# Patient Record
Sex: MALE | Race: WHITE | NOT HISPANIC OR LATINO | Employment: FULL TIME | URBAN - NONMETROPOLITAN AREA
[De-identification: names, ages, dates, MRNs, and addresses within clinical notes are randomized per-mention and may not be internally consistent; named-entity substitution may affect disease eponyms.]

---

## 2017-06-15 ENCOUNTER — APPOINTMENT (OUTPATIENT)
Dept: FAMILY MEDICINE CLINIC | Facility: CLINIC | Age: 27
End: 2017-06-15

## 2017-06-15 ENCOUNTER — GENERIC CONVERSION - ENCOUNTER (OUTPATIENT)
Dept: OTHER | Facility: OTHER | Age: 27
End: 2017-06-15

## 2017-06-15 PROCEDURE — T1015 CLINIC SERVICE: HCPCS | Performed by: FAMILY MEDICINE

## 2018-01-22 VITALS
BODY MASS INDEX: 31.98 KG/M2 | HEART RATE: 77 BPM | SYSTOLIC BLOOD PRESSURE: 130 MMHG | DIASTOLIC BLOOD PRESSURE: 82 MMHG | HEIGHT: 68 IN | WEIGHT: 211 LBS | RESPIRATION RATE: 16 BRPM | OXYGEN SATURATION: 98 % | TEMPERATURE: 96.7 F

## 2018-08-12 ENCOUNTER — HOSPITAL ENCOUNTER (EMERGENCY)
Facility: HOSPITAL | Age: 28
Discharge: HOME | End: 2018-08-12
Attending: EMERGENCY MEDICINE
Payer: COMMERCIAL

## 2018-08-12 VITALS
RESPIRATION RATE: 18 BRPM | WEIGHT: 230.6 LBS | TEMPERATURE: 99.1 F | DIASTOLIC BLOOD PRESSURE: 64 MMHG | HEIGHT: 68 IN | BODY MASS INDEX: 34.95 KG/M2 | SYSTOLIC BLOOD PRESSURE: 146 MMHG | OXYGEN SATURATION: 98 % | HEART RATE: 97 BPM

## 2018-08-12 DIAGNOSIS — R21 RASH: Primary | ICD-10-CM

## 2018-08-12 DIAGNOSIS — L23.7 POISON IVY: ICD-10-CM

## 2018-08-12 PROCEDURE — 99282 EMERGENCY DEPT VISIT SF MDM: CPT

## 2018-08-12 RX ORDER — SULFAMETHOXAZOLE AND TRIMETHOPRIM 800; 160 MG/1; MG/1
1 TABLET ORAL EVERY 12 HOURS SCHEDULED
Qty: 20 TABLET | Refills: 0 | Status: SHIPPED | OUTPATIENT
Start: 2018-08-12 | End: 2018-08-22

## 2018-08-12 RX ORDER — SULFAMETHOXAZOLE AND TRIMETHOPRIM 800; 160 MG/1; MG/1
1 TABLET ORAL ONCE
Status: COMPLETED | OUTPATIENT
Start: 2018-08-12 | End: 2018-08-12

## 2018-08-12 RX ORDER — CEPHALEXIN 250 MG/1
500 CAPSULE ORAL ONCE
Status: COMPLETED | OUTPATIENT
Start: 2018-08-12 | End: 2018-08-12

## 2018-08-12 RX ORDER — CEPHALEXIN 250 MG/1
500 CAPSULE ORAL 4 TIMES DAILY
Qty: 80 CAPSULE | Refills: 0 | Status: SHIPPED | OUTPATIENT
Start: 2018-08-12 | End: 2018-08-22

## 2018-08-12 RX ADMIN — SULFAMETHOXAZOLE AND TRIMETHOPRIM 1 TABLET: 800; 160 TABLET ORAL at 14:12

## 2018-08-12 RX ADMIN — CEPHALEXIN 500 MG: 250 CAPSULE ORAL at 14:13

## 2018-08-12 NOTE — DISCHARGE INSTRUCTIONS
Acute Rash   WHAT YOU NEED TO KNOW:   A rash is irritation, redness, or itchiness in the skin or mucus membranes  Mucus membranes are areas such as the lining of your nose or throat  Acute means the rash starts suddenly, worsens quickly, and lasts a short time  An acute rash may be caused by a disease, such as hepatitis or vasculitis  The rash may be a reaction to something you are allergic to, such as certain foods, or latex  Certain medicines, including antibiotics, NSAIDs, prescription pain medicine, and aspirin can also cause a rash  DISCHARGE INSTRUCTIONS:   Return to the emergency department if:   · You have sudden trouble breathing or chest pain  · You are vomiting, have a headache or muscle aches, and your throat hurts  Contact your healthcare provider if:   · You have a fever  · You get open wounds from scratching your skin, or you have a wound that is red, swollen, or painful  · Your rash lasts longer than 3 months  · You have swelling or pain in your joints  · You have questions or concerns about your condition or care  Medicines:  If your rash does not go away on its own, you may need the following medicines:  · Antihistamines  may be given to help decrease itching  · Steroids  may be given to decrease inflammation  · Antibiotics  help fight or prevent a bacterial infection  · Take your medicine as directed  Contact your healthcare provider if you think your medicine is not helping or if you have side effects  Tell him of her if you are allergic to any medicine  Keep a list of the medicines, vitamins, and herbs you take  Include the amounts, and when and why you take them  Bring the list or the pill bottles to follow-up visits  Carry your medicine list with you in case of an emergency  Prevent a rash or care for your skin when you have a rash:  Dry skin can lead to more problems  Do not scratch your skin if it itches  You may cause a skin infection by scratching   The following may prevent dry skin, and help your skin look better:  · Use thick cream lotions or petroleum jelly to help soothe your rash  These products work well on areas with thick skin, such as your feet  Cool compresses may also be used to soothe your skin  Apply a cool compress or a cool, wet towel, and then cover it with a dry towel  · Use lukewarm water when you bathe  Hot water may damage your skin more  Pat your skin dry  Do not rub your skin with a towel  · Use detergents, soaps, shampoos, and bubble baths made for sensitive skin  Wear clothes that are made of cotton instead of nylon or wool  Cotton is softer, so it will not hurt your skin as much  Follow up with your healthcare provider as directed: You may need to see a dermatologist if healthcare providers do not know what is causing your rash  You may also need to see a dermatologist if your rash does not get better even with treatment  You may need to see a dietitian if you have allergies to foods  Write down your questions so you remember to ask them during your visits  © 2017 [facility] Information is for End User's use only and may not be sold, redistributed or otherwise used for commercial purposes  All illustrations and images included in CareNotes® are the copyrighted property of [employer]  or [employer]  The above information is an  only  It is not intended as medical advice for individual conditions or treatments  Talk to your doctor, nurse or pharmacist before following any medical regimen to see if it is safe and effective for you  Contact Dermatitis   WHAT YOU NEED TO KNOW:   Contact dermatitis is a skin rash  It develops when you touch something that irritates your skin or causes an allergic reaction  DISCHARGE INSTRUCTIONS:   Call 911 for any of the following:   · You have sudden trouble breathing  · Your throat swells and you have trouble eating      · Your face is swollen  Contact your healthcare provider if:   · You have a fever  · Your blisters are draining pus  · Your rash spreads or does not get better, even after treatment  · You have questions or concerns about your condition or care  Medicines:   · Medicines  help decrease itching and swelling  They will be given as a topical medicine to apply to your rash or as a pill  · Take your medicine as directed  Contact your healthcare provider if you think your medicine is not helping or if you have side effects  Tell him or her if you are allergic to any medicine  Keep a list of the medicines, vitamins, and herbs you take  Include the amounts, and when and why you take them  Bring the list or the pill bottles to follow-up visits  Carry your medicine list with you in case of an emergency  Manage contact dermatitis:   · Take short baths or showers in cool water  Use mild soap or soap-free cleansers  Add oatmeal, baking soda, or cornstarch to the bath water to help decrease skin irritation  · Avoid skin irritants , such as makeup, hair products, soaps, and cleansers  Use products that do not contain perfume or dye  · Apply a cool compress to your rash  This will help soothe your skin  · Keep your skin moist   Rub unscented cream or lotion on your skin to prevent dryness and itching  Do this right after a bath or shower when your skin is still damp  Follow up with your healthcare provider or dermatologist in 2 to 3 days:  Write down your questions so you remember to ask them during your visits  © 2017 [employer] [employer] Information is for End User's use only and may not be sold, redistributed or otherwise used for commercial purposes  All illustrations and images included in CareNotes® are the copyrighted property of [employer]  or [employer]  The above information is an  only  It is not intended as medical advice for individual conditions or treatments   Talk to your doctor, nurse or pharmacist before following any medical regimen to see if it is safe and effective for you

## 2018-08-12 NOTE — ED PROVIDER NOTES
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     HPI   Patient is a 55-year-old male who reports to the emergency department after exposure to poison ivy  He has a rash over his right arm which is somewhat abraded because he scrubbed it very firmly with the hard side of a sponge  He has a mild amount of what appears to be surrounding cellulitis likely from scrubbing it with the sponge  Otherwise, well-appearing, no fevers, chills, sweats, dysuria, hematuria  No skin sloughing  No rashes in the mouth  No redness in the eyes  No bullae  No petechiae  No central clearing/ targetoid lesions to suggest erythema multiforme  No mucosal involvement  No neck stiffness  No hemorrhagic lesions  No lesions with central necrosis  No current fevers  No desquamation of the skin to suggest staph scalded skin syndrome  No blistering  No strawberry tongue  No recent tick bites to suggest [facility] spotted fever  No crepitus  Medical decision makin-year-old male, likely poison ivy exposure with some cellulitis on top of it  The patient (and any family present) verbalized understanding of the discharge instructions and warnings that would necessitate return to the Emergency Department  Gave verbal in addition to written discharge instructions  Specifically highlighted areas of special concern regarding the written and verbal discharge instructions and return precautions  All questions were answered prior to discharge  None       Past Medical History:   Diagnosis Date    Asthma        History reviewed  No pertinent surgical history  History reviewed  No pertinent family history  I have reviewed and agree with the history as documented      Social History   Substance Use Topics    Smoking status: Never Smoker    Smokeless tobacco: Never Used    Alcohol use No        Review of Systems   Constitutional: Negative for chills and fever  Skin: Positive for rash  All other systems reviewed and are negative  Physical Exam  Physical Exam   Constitutional: He is oriented to person, place, and time  He appears well-developed and well-nourished  HENT:   Head: Normocephalic and atraumatic  Eyes: EOM are normal  Pupils are equal, round, and reactive to light  Neck: Normal range of motion  Neck supple  Cardiovascular: Normal rate, regular rhythm and normal heart sounds  No murmur heard  Pulmonary/Chest: Effort normal and breath sounds normal  No respiratory distress  He has no wheezes  Abdominal: Soft  Bowel sounds are normal  He exhibits no distension  There is no tenderness  Musculoskeletal: Normal range of motion  He exhibits no edema or tenderness  Neurological: He is alert and oriented to person, place, and time  No cranial nerve deficit  Coordination normal    Skin: Skin is warm and dry  Rash noted  He is not diaphoretic  There is erythema  Psychiatric: He has a normal mood and affect  His behavior is normal    Nursing note and vitals reviewed        Vital Signs  ED Triage Vitals [08/12/18 1359]   Temperature Pulse Respirations Blood Pressure SpO2   99 1 °F (37 3 °C) 97 18 146/64 98 %      Temp Source Heart Rate Source Patient Position - Orthostatic VS BP Location FiO2 (%)   Temporal Monitor Lying Right arm --      Pain Score       --           Vitals:    08/12/18 1359   BP: 146/64   Pulse: 97   Patient Position - Orthostatic VS: Lying       Visual Acuity      ED Medications  Medications   sulfamethoxazole-trimethoprim (BACTRIM DS) 800-160 mg per tablet 1 tablet (1 tablet Oral Given 8/12/18 1412)   cephalexin (KEFLEX) capsule 500 mg (500 mg Oral Given 8/12/18 1413)       Diagnostic Studies  Results Reviewed     None                 No orders to display              Procedures  Procedures       Phone Contacts  ED Phone Contact    ED Course MDM  CritCare Time    Disposition  Final diagnoses:   Rash   Poison ivy     Time reflects when diagnosis was documented in both MDM as applicable and the Disposition within this note     Time User Action Codes Description Comment    8/12/2018  2:02 PM Farrah Bull, [address] Rash     8/12/2018  2:02 PM Farrah BullAnalilia Add [L23 7] Poison ivy       ED Disposition     ED Disposition Condition Comment    Discharge  Tesfaye Ashton discharge to home/self care  Condition at discharge: Good        Follow-up Information     Follow up With Specialties Details Why Contact Info    Bonnie Lamb PA-C Physician Assistant In 2 days  [address] [place] [address],  [address] [phone]            Discharge Medication List as of 8/12/2018  2:08 PM      START taking these medications    Details   cephalexin (KEFLEX) 250 mg capsule Take 2 capsules (500 mg total) by mouth 4 (four) times a day for 10 days, Starting Sun 8/12/2018, Until Wed 8/22/2018, Print      sulfamethoxazole-trimethoprim (BACTRIM DS) 800-160 mg per tablet Take 1 tablet by mouth every 12 (twelve) hours for 10 days, Starting Sun 8/12/2018, Until Wed 8/22/2018, Print           No discharge procedures on file      ED Provider  Electronically Signed by           Gaurav Allan MD  08/12/18 7489

## 2018-09-10 ENCOUNTER — HOSPITAL ENCOUNTER (EMERGENCY)
Facility: HOSPITAL | Age: 28
Discharge: HOME | End: 2018-09-10
Attending: EMERGENCY MEDICINE
Payer: COMMERCIAL

## 2018-09-10 VITALS
WEIGHT: 233.03 LBS | DIASTOLIC BLOOD PRESSURE: 70 MMHG | OXYGEN SATURATION: 97 % | HEART RATE: 73 BPM | TEMPERATURE: 99.7 F | RESPIRATION RATE: 18 BRPM | BODY MASS INDEX: 35.43 KG/M2 | SYSTOLIC BLOOD PRESSURE: 148 MMHG

## 2018-09-10 DIAGNOSIS — L50.9 URTICARIA: Primary | ICD-10-CM

## 2018-09-10 PROCEDURE — 99282 EMERGENCY DEPT VISIT SF MDM: CPT

## 2018-09-10 RX ORDER — FAMOTIDINE 20 MG/1
20 TABLET, FILM COATED ORAL 2 TIMES DAILY
Qty: 30 TABLET | Refills: 0 | Status: ON HOLD | OUTPATIENT
Start: 2018-09-10 | End: 2022-03-08

## 2018-09-10 RX ORDER — PREDNISONE 20 MG/1
60 TABLET ORAL DAILY
Qty: 12 TABLET | Refills: 0 | Status: ON HOLD | OUTPATIENT
Start: 2018-09-10 | End: 2022-03-08

## 2018-09-10 RX ORDER — LORATADINE 10 MG/1
10 TABLET ORAL ONCE
Status: COMPLETED | OUTPATIENT
Start: 2018-09-10 | End: 2018-09-10

## 2018-09-10 RX ORDER — FAMOTIDINE 20 MG/1
20 TABLET, FILM COATED ORAL ONCE
Status: COMPLETED | OUTPATIENT
Start: 2018-09-10 | End: 2018-09-10

## 2018-09-10 RX ORDER — DIPHENHYDRAMINE HCL 25 MG
25 CAPSULE ORAL EVERY 6 HOURS PRN
Qty: 20 CAPSULE | Refills: 0 | Status: SHIPPED | OUTPATIENT
Start: 2018-09-10

## 2018-09-10 RX ORDER — PREDNISONE 20 MG/1
60 TABLET ORAL ONCE
Status: COMPLETED | OUTPATIENT
Start: 2018-09-10 | End: 2018-09-10

## 2018-09-10 RX ADMIN — FAMOTIDINE 20 MG: 20 TABLET ORAL at 16:22

## 2018-09-10 RX ADMIN — PREDNISONE 60 MG: 20 TABLET ORAL at 16:22

## 2018-09-10 RX ADMIN — LORATADINE 10 MG: 10 TABLET ORAL at 16:22

## 2018-09-10 NOTE — ED PROVIDER NOTES
History  Chief Complaint   Patient presents with    Itching     woke up today with hives and itching     32-year-old male presents for evaluation of urticaria  Patient states he woke with this this morning at 0730 hours  There are no precipitating factors  He has had no new foods not changed laundry detergents has had no viral illnesses  Patient does not have any wheezing perioral swelling or tongue swelling or other problems  Rash   Location: Bilateral arms bilateral legs and left flank  Quality: itchiness    Severity:  Mild  Onset quality:  Gradual  Duration:  9 hours  Progression:  Waxing and waning  Chronicity:  New  Context: not animal contact, not chemical exposure, not diapers and not eggs    Relieved by:  Nothing  Worsened by:  Nothing  Ineffective treatments:  None tried  Associated symptoms: no abdominal pain, no diarrhea, no fatigue, no fever, no headaches and no joint pain        None       Past Medical History:   Diagnosis Date    Asthma        History reviewed  No pertinent surgical history  History reviewed  No pertinent family history  I have reviewed and agree with the history as documented  Social History   Substance Use Topics    Smoking status: Never Smoker    Smokeless tobacco: Never Used    Alcohol use No        Review of Systems   Constitutional: Negative for fatigue and fever  HENT: Negative for congestion, dental problem, drooling and ear discharge  Eyes: Negative for pain, discharge and itching  Respiratory: Negative for apnea, choking and chest tightness  Cardiovascular: Negative for chest pain and leg swelling  Gastrointestinal: Negative for abdominal pain and diarrhea  Endocrine: Negative for cold intolerance, heat intolerance and polydipsia  Genitourinary: Negative for difficulty urinating, dysuria, enuresis and flank pain  Musculoskeletal: Negative for arthralgias, back pain and gait problem  Skin: Positive for rash     Allergic/Immunologic: Negative for environmental allergies and food allergies  Neurological: Negative for dizziness, facial asymmetry and headaches  Hematological: Negative for adenopathy  Psychiatric/Behavioral: Negative for agitation, behavioral problems and confusion  All other systems reviewed and are negative  Physical Exam  Physical Exam   Constitutional: He appears well-developed and well-nourished  HENT:   Head: Normocephalic  Right Ear: External ear normal    Left Ear: External ear normal    Eyes: Pupils are equal, round, and reactive to light  Right eye exhibits no discharge  Left eye exhibits no discharge  Neck: Normal range of motion  No JVD present  No tracheal deviation present  No thyromegaly present  Cardiovascular: Normal rate, regular rhythm and normal heart sounds  Pulmonary/Chest: Effort normal  No respiratory distress  He has no wheezes  He has no rales  Abdominal: Soft  He exhibits no distension  There is no tenderness  There is no guarding  Musculoskeletal: Normal range of motion  He exhibits no edema or deformity  Neurological: He is alert  He displays normal reflexes  No cranial nerve deficit  He exhibits normal muscle tone  Coordination normal    Skin: Capillary refill takes less than 2 seconds  No erythema  Urticarial plaques to the left ankle left flank left arm  Also right lower leg   Psychiatric: He has a normal mood and affect  His behavior is normal  Thought content normal    Vitals reviewed        Vital Signs  ED Triage Vitals [09/10/18 1610]   Temperature Pulse Respirations Blood Pressure SpO2   99 7 °F (37 6 °C) 84 18 161/81 98 %      Temp Source Heart Rate Source Patient Position - Orthostatic VS BP Location FiO2 (%)   Temporal Right Sitting Right arm --      Pain Score       No Pain           Vitals:    09/10/18 1610   BP: 161/81   Pulse: 84   Patient Position - Orthostatic VS: Sitting       Visual Acuity      ED Medications  Medications   predniSONE tablet 60 mg (60 mg Oral Given 9/10/18 1622)   famotidine (PEPCID) tablet 20 mg (20 mg Oral Given 9/10/18 1622)   loratadine (CLARITIN) tablet 10 mg (10 mg Oral Given 9/10/18 1622)       Diagnostic Studies  Results Reviewed     None                 No orders to display              Procedures  Procedures       Phone Contacts  ED Phone Contact    ED Course                               MDM  CritCare Time    Disposition  Final diagnoses:   Urticaria     Time reflects when diagnosis was documented in both MDM as applicable and the Disposition within this note     Time User Action Codes Description Comment    9/10/2018  4:16 PM Axel Tate Add [L50 9] Urticaria       ED Disposition     None      Follow-up Information    None         Patient's Medications   Discharge Prescriptions    DIPHENHYDRAMINE (BENADRYL) 25 MG CAPSULE    Take 1 capsule (25 mg total) by mouth every 6 (six) hours as needed for itching       Start Date: 9/10/2018 End Date: --       Order Dose: 25 mg       Quantity: 20 capsule    Refills: 0    FAMOTIDINE (PEPCID) 20 MG TABLET    Take 1 tablet (20 mg total) by mouth 2 (two) times a day       Start Date: 9/10/2018 End Date: --       Order Dose: 20 mg       Quantity: 30 tablet    Refills: 0    PREDNISONE 20 MG TABLET    Take 3 tablets (60 mg total) by mouth daily       Start Date: 9/10/2018 End Date: --       Order Dose: 60 mg       Quantity: 12 tablet    Refills: 0     No discharge procedures on file      ED Provider  Electronically Signed by           Harsha Hutchinson DO  09/10/18 8571

## 2018-09-10 NOTE — DISCHARGE INSTRUCTIONS
Urticaria   WHAT YOU NEED TO KNOW:   Urticaria is also called hives  Hives can change size and shape, and appear anywhere on your skin  They can be mild or severe and last from a few minutes to a few days  Hives may be a sign of a severe allergic reaction called anaphylaxis that needs immediate treatment  Urticaria that lasts longer than 6 weeks may be a chronic condition that needs long-term treatment  DISCHARGE INSTRUCTIONS:   Call 911 for signs or symptoms of anaphylaxis,  such as trouble breathing, swelling in your mouth or throat, or wheezing  You may also have itching, a rash, or feel like you are going to faint  Return to the emergency department if:   · Your heart is beating faster than it normally does  · You have cramping or severe pain in your abdomen  Contact your healthcare provider if:   · You have a fever  · Your skin still itches 24 hours after you take your medicine  · You still have hives after 7 days  · Your joints are painful and swollen  · You have questions or concerns about your condition or care  Medicines:   · Epinephrine  is used to treat severe allergic reactions such as anaphylaxis  · Antihistamines  decrease mild symptoms such as itching or a rash  · Steroids  decrease redness, pain, and swelling  · Take your medicine as directed  Contact your healthcare provider if you think your medicine is not helping or if you have side effects  Tell him of her if you are allergic to any medicine  Keep a list of the medicines, vitamins, and herbs you take  Include the amounts, and when and why you take them  Bring the list or the pill bottles to follow-up visits  Carry your medicine list with you in case of an emergency  Steps to take for signs or symptoms of anaphylaxis:   · Immediately  give 1 shot of epinephrine only into the outer thigh muscle  · Leave the shot in place  as directed   Your healthcare provider may recommend you leave it in place for up to 10 seconds before you remove it  This helps make sure all of the epinephrine is delivered  · Call 911 and go to the emergency department,  even if the shot improved symptoms  Do not drive yourself  Bring the used epinephrine shot with you  Safety precautions to take if you are at risk for anaphylaxis:   · Keep 2 shots of epinephrine with you at all times  You may need a second shot, because epinephrine only works for about 20 minutes and symptoms may return  Your healthcare provider can show you and family members how to give the shot  Check the expiration date every month and replace it before it expires  · Create an action plan  Your healthcare provider can help you create a written plan that explains the allergy and an emergency plan to treat a reaction  The plan explains when to give a second epinephrine shot if symptoms return or do not improve after the first  Give copies of the action plan and emergency instructions to family members, work and school staff, and  providers  Show them how to give a shot of epinephrine  · Be careful when you exercise  If you have had exercise-induced anaphylaxis, do not exercise right after you eat  Stop exercising right away if you start to develop any signs or symptoms of anaphylaxis  You may first feel tired, warm, or have itchy skin  Hives, swelling, and severe breathing problems may develop if you continue to exercise  · Carry medical alert identification  Wear medical alert jewelry or carry a card that explains the allergy  Ask your healthcare provider where to get these items  · Keep a record of triggers and symptoms  Record everything you eat, drink, or apply to your skin for 3 weeks  Include stressful events and what you were doing right before your hives started  Bring the record with you to follow-up visits with your healthcare provider  Manage urticaria:   · Cool your skin  This may help decrease itching  Apply a cool pack to your hives  Dip a hand towel in cool water, wring it out, and place it on your hives  You may also soak your skin in a cool oatmeal bath  · Do not rub your hives  This can irritate your skin and cause more hives  · Wear loose clothing  Tight clothes may irritate your skin and cause more hives  · Manage stress  Stress may trigger hives, or make them worse  Learn new ways to relax, such as deep breathing  Follow up with your healthcare provider as directed:  Write down your questions so you remember to ask them during your visits  © 2017 [address] [employer] Information is for End User's use only and may not be sold, redistributed or otherwise used for commercial purposes  All illustrations and images included in CareNotes® are the copyrighted property of A D A M [employer]  or [facility]  The above information is an  only  It is not intended as medical advice for individual conditions or treatments  Talk to your doctor, nurse or pharmacist before following any medical regimen to see if it is safe and effective for you

## 2020-01-11 ENCOUNTER — TRANSCRIBE ORDERS (OUTPATIENT)
Dept: ADMINISTRATIVE | Facility: HOSPITAL | Age: 30
End: 2020-01-11

## 2020-01-11 ENCOUNTER — APPOINTMENT (OUTPATIENT)
Dept: LAB | Facility: HOSPITAL | Age: 30
End: 2020-01-11
Payer: COMMERCIAL

## 2020-01-11 DIAGNOSIS — Z13.220 SCREENING FOR LIPOID DISORDERS: ICD-10-CM

## 2020-01-11 DIAGNOSIS — Z13.9 SCREENING FOR UNSPECIFIED CONDITION: ICD-10-CM

## 2020-01-11 DIAGNOSIS — Z13.9 SCREENING FOR UNSPECIFIED CONDITION: Primary | ICD-10-CM

## 2020-01-11 LAB
ALBUMIN SERPL BCP-MCNC: 3.9 G/DL (ref 3.5–5)
ALP SERPL-CCNC: 80 U/L (ref 46–116)
ALT SERPL W P-5'-P-CCNC: 43 U/L (ref 12–78)
ANION GAP SERPL CALCULATED.3IONS-SCNC: 6 MMOL/L (ref 4–13)
AST SERPL W P-5'-P-CCNC: 26 U/L (ref 5–45)
BILIRUB SERPL-MCNC: 1.4 MG/DL (ref 0.2–1)
BUN SERPL-MCNC: 10 MG/DL (ref 5–25)
CALCIUM SERPL-MCNC: 8.8 MG/DL (ref 8.3–10.1)
CHLORIDE SERPL-SCNC: 102 MMOL/L (ref 100–108)
CHOLEST SERPL-MCNC: 157 MG/DL (ref 50–200)
CO2 SERPL-SCNC: 29 MMOL/L (ref 21–32)
CREAT SERPL-MCNC: 0.9 MG/DL (ref 0.6–1.3)
ERYTHROCYTE [DISTWIDTH] IN BLOOD BY AUTOMATED COUNT: 11.9 % (ref 11.6–15.1)
GFR SERPL CREATININE-BSD FRML MDRD: 116 ML/MIN/1.73SQ M
GLUCOSE P FAST SERPL-MCNC: 96 MG/DL (ref 65–99)
HCT VFR BLD AUTO: 46.9 % (ref 36.5–49.3)
HDLC SERPL-MCNC: 44 MG/DL
HGB BLD-MCNC: 15.3 G/DL (ref 12–17)
LDLC SERPL CALC-MCNC: 89 MG/DL (ref 0–100)
MCH RBC QN AUTO: 29.9 PG (ref 26.8–34.3)
MCHC RBC AUTO-ENTMCNC: 32.6 G/DL (ref 31.4–37.4)
MCV RBC AUTO: 92 FL (ref 82–98)
NONHDLC SERPL-MCNC: 113 MG/DL
PLATELET # BLD AUTO: 260 THOUSANDS/UL (ref 149–390)
PMV BLD AUTO: 8.9 FL (ref 8.9–12.7)
POTASSIUM SERPL-SCNC: 4.2 MMOL/L (ref 3.5–5.3)
PROT SERPL-MCNC: 7.8 G/DL (ref 6.4–8.2)
RBC # BLD AUTO: 5.12 MILLION/UL (ref 3.88–5.62)
SODIUM SERPL-SCNC: 137 MMOL/L (ref 136–145)
TRIGL SERPL-MCNC: 122 MG/DL
TSH SERPL DL<=0.05 MIU/L-ACNC: 2.02 UIU/ML (ref 0.36–3.74)
WBC # BLD AUTO: 7.04 THOUSAND/UL (ref 4.31–10.16)

## 2020-01-11 PROCEDURE — 84443 ASSAY THYROID STIM HORMONE: CPT

## 2020-01-11 PROCEDURE — 80053 COMPREHEN METABOLIC PANEL: CPT

## 2020-01-11 PROCEDURE — 85027 COMPLETE CBC AUTOMATED: CPT

## 2020-01-11 PROCEDURE — 36415 COLL VENOUS BLD VENIPUNCTURE: CPT

## 2020-01-11 PROCEDURE — 80061 LIPID PANEL: CPT

## 2020-06-17 ENCOUNTER — TRANSCRIBE ORDERS (OUTPATIENT)
Dept: ADMINISTRATIVE | Facility: HOSPITAL | Age: 30
End: 2020-06-17

## 2020-06-17 DIAGNOSIS — R10.84 ABDOMINAL PAIN, GENERALIZED: Primary | ICD-10-CM

## 2020-06-24 ENCOUNTER — HOSPITAL ENCOUNTER (OUTPATIENT)
Dept: ULTRASOUND IMAGING | Facility: HOSPITAL | Age: 30
Discharge: HOME | End: 2020-06-24
Payer: COMMERCIAL

## 2020-06-24 DIAGNOSIS — R10.84 ABDOMINAL PAIN, GENERALIZED: ICD-10-CM

## 2020-06-24 PROCEDURE — 76705 ECHO EXAM OF ABDOMEN: CPT

## 2020-06-30 ENCOUNTER — TRANSCRIBE ORDERS (OUTPATIENT)
Dept: ADMINISTRATIVE | Facility: HOSPITAL | Age: 30
End: 2020-06-30

## 2020-06-30 DIAGNOSIS — R10.84 ABDOMINAL PAIN, GENERALIZED: Primary | ICD-10-CM

## 2020-07-14 ENCOUNTER — HOSPITAL ENCOUNTER (OUTPATIENT)
Dept: NUCLEAR MEDICINE | Facility: HOSPITAL | Age: 30
Discharge: HOME | End: 2020-07-14

## 2020-07-14 DIAGNOSIS — R10.84 ABDOMINAL PAIN, GENERALIZED: ICD-10-CM

## 2020-07-14 PROCEDURE — 78227 HEPATOBIL SYST IMAGE W/DRUG: CPT

## 2020-07-14 PROCEDURE — A9537 TC99M MEBROFENIN: HCPCS

## 2020-07-14 RX ADMIN — SINCALIDE 2 MCG: 5 INJECTION, POWDER, LYOPHILIZED, FOR SOLUTION INTRAVENOUS at 10:08

## 2021-04-07 ENCOUNTER — OFFICE VISIT (OUTPATIENT)
Dept: GASTROENTEROLOGY | Facility: CLINIC | Age: 31
End: 2021-04-07
Payer: COMMERCIAL

## 2021-04-07 ENCOUNTER — TELEPHONE (OUTPATIENT)
Dept: GASTROENTEROLOGY | Facility: CLINIC | Age: 31
End: 2021-04-07

## 2021-04-07 VITALS
HEIGHT: 68 IN | WEIGHT: 211.6 LBS | TEMPERATURE: 97.8 F | SYSTOLIC BLOOD PRESSURE: 119 MMHG | DIASTOLIC BLOOD PRESSURE: 78 MMHG | BODY MASS INDEX: 32.07 KG/M2 | HEART RATE: 76 BPM

## 2021-04-07 DIAGNOSIS — R10.13 EPIGASTRIC PAIN: Primary | ICD-10-CM

## 2021-04-07 DIAGNOSIS — E80.6 BILIRUBINEMIA: ICD-10-CM

## 2021-04-07 PROBLEM — Z78.9 KNOWN HEALTH PROBLEMS: NONE: Status: ACTIVE | Noted: 2017-06-15

## 2021-04-07 PROCEDURE — 99203 OFFICE O/P NEW LOW 30 MIN: CPT | Performed by: INTERNAL MEDICINE

## 2021-04-07 RX ORDER — LIDOCAINE 50 MG/G
1 PATCH TOPICAL DAILY
Qty: 30 PATCH | Refills: 3 | Status: ON HOLD | OUTPATIENT
Start: 2021-04-07 | End: 2022-03-08

## 2021-04-07 NOTE — TELEPHONE ENCOUNTER
As per [facility] [employer] [id number]  Insurance is active as of 2/1/2019  Irvin Avila is in network      Reference # : [id number] 4/7/21

## 2021-04-07 NOTE — PATIENT INSTRUCTIONS
Abdominal pain  - most likely musculoskeletal   - try heating pad  - try lidocaine patch/cream for pain  - can also consider back brace  Continue if helpful  Bilirubin elevated  - have blood drawn to check liver panel    Follow up as needed    Gave labs, patient expressed understanding

## 2021-04-07 NOTE — PROGRESS NOTES
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  - RUQ US neg, normal HIDA scan  - check hepatic panel     Follow up as needed    ______________________________________________________________________    HPI:  36 yo M who presents to discuss abdominal pain and elevated bilirubin  Co-morbidities:     Abd pain  - began 2 years ago  - upper abd  - noticed when stand, improves sitting down and laying down  - intermittent, can go months without it, can last all day  - more noticeable standing or lifting at work  - sometimes tender to palpation  - no association with meals or BM  - no back pain  - RUQ US neg, normal HIDA scan    Intentional weight loss of about 20 lbs over the past 4-5 months  ROS: No nausea, vomiting, heartburn, acid reflux, diarrhea, constipation, odynophagia, dysphagia, hematemesis, melena, hematochezia, early satiety, appetite changes, wt loss  FH: no GI cancers  M- pancreatitis, diverticulitis    Prior endoscopies: none      REVIEW OF SYSTEMS:    CONSTITUTIONAL: Denies any fever, chills, rigors, and weight loss  HEENT: No earache or tinnitus  Denies hearing loss or visual disturbances  CARDIOVASCULAR: No chest pain or palpitations  RESPIRATORY: Denies any cough, hemoptysis, shortness of breath or dyspnea on exertion  GASTROINTESTINAL: As noted in the History of Present Illness  GENITOURINARY: No problems with urination  Denies any hematuria or dysuria  NEUROLOGIC: No dizziness or vertigo, denies headaches  MUSCULOSKELETAL: Denies any muscle or joint pain  SKIN: Denies skin rashes or itching     ENDOCRINE: Denies excessive thirst  Denies intolerance to heat or cold  PSYCHOSOCIAL: Denies depression or anxiety  Denies any recent memory loss  Historical Information   Past Medical History:   Diagnosis Date    Asthma      History reviewed  No pertinent surgical history  Social History   Social History     Substance and Sexual Activity   Alcohol Use No     Social History     Substance and Sexual Activity   Drug Use No     Social History     Tobacco Use   Smoking Status Never Smoker   Smokeless Tobacco Never Used     History reviewed  No pertinent family history  Meds/Allergies       Current Outpatient Medications:     diphenhydrAMINE (BENADRYL) 25 mg capsule    famotidine (PEPCID) 20 mg tablet    predniSONE 20 mg tablet    No Known Allergies        Objective     Blood pressure 119/78, pulse 76, temperature 97 8 °F (36 6 °C), temperature source Tympanic, height 5' 8" (1 727 m), weight 96 kg (211 lb 9 6 oz)  Body mass index is 32 17 kg/m²  PHYSICAL EXAM:      General Appearance:   Alert, cooperative, no distress   HEENT:   Normocephalic, atraumatic, anicteric  Neck:  Supple, symmetrical, trachea midline   Lungs:   Clear to auscultation bilaterally; no rales, rhonchi or wheezing; respirations unlabored    Heart[de-identified]   Regular rate and rhythm; no murmur, rub, or gallop  Abdomen:   Soft, non-tender, non-distended; normal bowel sounds; no masses, no organomegaly    Rectal:   Deferred   Neuro:   Alert, oriented, no gross deficits, normal strength and tone   Extremities:  No cyanosis, clubbing or edema    Psych:  Normal mood and affect    Skin:  No jaundice, rashes, or lesions    Lymph nodes:  No palpable cervical lymphadenopathy        Lab Results:   No visits with results within 1 Day(s) from this visit     Latest known visit with results is:   Appointment on 01/11/2020   Component Date Value    WBC 01/11/2020 7 04     RBC 01/11/2020 5 12     Hemoglobin 01/11/2020 15 3     Hematocrit 01/11/2020 46 9     MCV 01/11/2020 [address] [place] [address] 01/11/2020 29 9     MCHC 01/11/2020 32 6     RDW 01/11/2020 11 9     Platelets 29/56/4386 260     MPV 01/11/2020 8 9     Sodium 01/11/2020 137     Potassium 01/11/2020 4 2     Chloride 01/11/2020 102     CO2 01/11/2020 29     ANION GAP 01/11/2020 6     BUN 01/11/2020 10     Creatinine 01/11/2020 0 90     Glucose, Fasting 01/11/2020 96     Calcium 01/11/2020 8 8     AST 01/11/2020 26     ALT 01/11/2020 43     Alkaline Phosphatase 01/11/2020 80     Total Protein 01/11/2020 7 8     Albumin 01/11/2020 3 9     Total Bilirubin 01/11/2020 1 40*    eGFR 01/11/2020 116     Cholesterol 01/11/2020 157     Triglycerides 01/11/2020 122     HDL, Direct 01/11/2020 44     LDL Calculated 01/11/2020 89     Non-HDL-Chol (CHOL-HDL) 01/11/2020 113     TSH 3RD GENERATON 01/11/2020 2 018          Radiology Results:   No results found

## 2021-09-20 ENCOUNTER — HOSPITAL ENCOUNTER (EMERGENCY)
Facility: HOSPITAL | Age: 31
Discharge: HOME | End: 2021-09-20
Attending: EMERGENCY MEDICINE | Admitting: EMERGENCY MEDICINE
Payer: COMMERCIAL

## 2021-09-20 ENCOUNTER — APPOINTMENT (EMERGENCY)
Dept: CT IMAGING | Facility: HOSPITAL | Age: 31
End: 2021-09-20
Payer: COMMERCIAL

## 2021-09-20 VITALS
DIASTOLIC BLOOD PRESSURE: 72 MMHG | TEMPERATURE: 98.9 F | BODY MASS INDEX: 31.83 KG/M2 | HEIGHT: 68 IN | WEIGHT: 210 LBS | RESPIRATION RATE: 18 BRPM | SYSTOLIC BLOOD PRESSURE: 125 MMHG | OXYGEN SATURATION: 97 % | HEART RATE: 85 BPM

## 2021-09-20 DIAGNOSIS — R10.9 ABDOMINAL PAIN: Primary | ICD-10-CM

## 2021-09-20 LAB
ALBUMIN SERPL BCP-MCNC: 4.2 G/DL (ref 3.5–5)
ALP SERPL-CCNC: 77 U/L (ref 46–116)
ALT SERPL W P-5'-P-CCNC: 49 U/L (ref 12–78)
ANION GAP SERPL CALCULATED.3IONS-SCNC: 9 MMOL/L (ref 4–13)
AST SERPL W P-5'-P-CCNC: 24 U/L (ref 5–45)
BASOPHILS # BLD AUTO: 0.06 THOUSANDS/ΜL (ref 0–0.1)
BASOPHILS NFR BLD AUTO: 1 % (ref 0–1)
BILIRUB SERPL-MCNC: 1.34 MG/DL (ref 0.2–1)
BUN SERPL-MCNC: 19 MG/DL (ref 5–25)
CALCIUM SERPL-MCNC: 8.6 MG/DL (ref 8.3–10.1)
CHLORIDE SERPL-SCNC: 106 MMOL/L (ref 100–108)
CO2 SERPL-SCNC: 26 MMOL/L (ref 21–32)
CREAT SERPL-MCNC: 0.93 MG/DL (ref 0.6–1.3)
EOSINOPHIL # BLD AUTO: 0.53 THOUSAND/ΜL (ref 0–0.61)
EOSINOPHIL NFR BLD AUTO: 5 % (ref 0–6)
ERYTHROCYTE [DISTWIDTH] IN BLOOD BY AUTOMATED COUNT: 12.2 % (ref 11.6–15.1)
GFR SERPL CREATININE-BSD FRML MDRD: 111 ML/MIN/1.73SQ M
GLUCOSE SERPL-MCNC: 94 MG/DL (ref 65–140)
HCT VFR BLD AUTO: 44.9 % (ref 36.5–49.3)
HGB BLD-MCNC: 14.6 G/DL (ref 12–17)
IMM GRANULOCYTES # BLD AUTO: 0.04 THOUSAND/UL (ref 0–0.2)
IMM GRANULOCYTES NFR BLD AUTO: 0 % (ref 0–2)
LIPASE SERPL-CCNC: 98 U/L (ref 73–393)
LYMPHOCYTES # BLD AUTO: 2.24 THOUSANDS/ΜL (ref 0.6–4.47)
LYMPHOCYTES NFR BLD AUTO: 21 % (ref 14–44)
MCH RBC QN AUTO: 29.3 PG (ref 26.8–34.3)
MCHC RBC AUTO-ENTMCNC: 32.5 G/DL (ref 31.4–37.4)
MCV RBC AUTO: 90 FL (ref 82–98)
MONOCYTES # BLD AUTO: 0.65 THOUSAND/ΜL (ref 0.17–1.22)
MONOCYTES NFR BLD AUTO: 6 % (ref 4–12)
NEUTROPHILS # BLD AUTO: 6.93 THOUSANDS/ΜL (ref 1.85–7.62)
NEUTS SEG NFR BLD AUTO: 67 % (ref 43–75)
NRBC BLD AUTO-RTO: 0 /100 WBCS
PLATELET # BLD AUTO: 266 THOUSANDS/UL (ref 149–390)
PMV BLD AUTO: 9 FL (ref 8.9–12.7)
POTASSIUM SERPL-SCNC: 3.8 MMOL/L (ref 3.5–5.3)
PROT SERPL-MCNC: 7.5 G/DL (ref 6.4–8.2)
RBC # BLD AUTO: 4.98 MILLION/UL (ref 3.88–5.62)
SODIUM SERPL-SCNC: 141 MMOL/L (ref 136–145)
WBC # BLD AUTO: 10.45 THOUSAND/UL (ref 4.31–10.16)

## 2021-09-20 PROCEDURE — 36415 COLL VENOUS BLD VENIPUNCTURE: CPT | Performed by: EMERGENCY MEDICINE

## 2021-09-20 PROCEDURE — 99284 EMERGENCY DEPT VISIT MOD MDM: CPT

## 2021-09-20 PROCEDURE — 99284 EMERGENCY DEPT VISIT MOD MDM: CPT | Performed by: EMERGENCY MEDICINE

## 2021-09-20 PROCEDURE — 96360 HYDRATION IV INFUSION INIT: CPT

## 2021-09-20 PROCEDURE — 83690 ASSAY OF LIPASE: CPT | Performed by: EMERGENCY MEDICINE

## 2021-09-20 PROCEDURE — 74177 CT ABD & PELVIS W/CONTRAST: CPT

## 2021-09-20 PROCEDURE — 80053 COMPREHEN METABOLIC PANEL: CPT | Performed by: EMERGENCY MEDICINE

## 2021-09-20 PROCEDURE — 85025 COMPLETE CBC W/AUTO DIFF WBC: CPT | Performed by: EMERGENCY MEDICINE

## 2021-09-20 RX ORDER — DICYCLOMINE HCL 20 MG
20 TABLET ORAL 2 TIMES DAILY
Qty: 20 TABLET | Refills: 0 | Status: ON HOLD | OUTPATIENT
Start: 2021-09-20 | End: 2022-03-08

## 2021-09-20 RX ADMIN — SODIUM CHLORIDE 1000 ML: 0.9 INJECTION, SOLUTION INTRAVENOUS at 15:47

## 2021-09-20 RX ADMIN — IOHEXOL 100 ML: 350 INJECTION, SOLUTION INTRAVENOUS at 16:33

## 2021-09-20 NOTE — ED NOTES
Patient transported to [address] [place] [address] [facility], [address] [place] [address]  09/20/21 0712

## 2021-09-20 NOTE — ED PROVIDER NOTES
History  Chief Complaint   Patient presents with    Abdominal Pain     pt reports chronic abdominal pain; pcp have not been able to dx, here for dwayne     Patient is a 52-year-old male complains of intermittent episodes of abdominal pain  Patient states the pain is located between his umbilicus and epigastric region in the middle portion of his abdomen  Denies any previous abdominal surgeries  States symptoms have been intermittent for over 1 year  Has been seen by his primary care physician for this complaint  Also was referred to Gastroenterology  Has not undergone a EGD or colonoscopy  No recent travel  Patient states the pain is worsened when bending over and relieved with remaining upright  It is not exacerbated with food  He states he episodes of harsh sharp but last only a few minutes  Currently he does not have tenderness but states that she bent over into a fetal position, this would exacerbate his pain  Denies any fevers or chills  No nausea or vomiting  No change in bowel habits  No change in appetite  Prior to Admission Medications   Prescriptions Last Dose Informant Patient Reported? Taking?    diphenhydrAMINE (BENADRYL) 25 mg capsule Not Taking at Unknown time Self No No   Sig: Take 1 capsule (25 mg total) by mouth every 6 (six) hours as needed for itching   Patient not taking: Reported on 9/20/2021   famotidine (PEPCID) 20 mg tablet Not Taking at Unknown time Self No No   Sig: Take 1 tablet (20 mg total) by mouth 2 (two) times a day   Patient not taking: Reported on 9/20/2021   lidocaine (LIDODERM) 5 % Not Taking at Unknown time  No No   Sig: Apply 1 patch topically daily Remove & Discard patch within 12 hours or as directed by MD   Patient not taking: Reported on 9/20/2021   predniSONE 20 mg tablet Not Taking at Unknown time Self No No   Sig: Take 3 tablets (60 mg total) by mouth daily   Patient not taking: Reported on 4/7/2021      Facility-Administered Medications: None Past Medical History:   Diagnosis Date    Asthma        History reviewed  No pertinent surgical history  History reviewed  No pertinent family history  I have reviewed and agree with the history as documented  E-Cigarette/Vaping     E-Cigarette/Vaping Substances     Social History     Tobacco Use    Smoking status: Never Smoker    Smokeless tobacco: Never Used   Substance Use Topics    Alcohol use: No    Drug use: No       Review of Systems   Constitutional: Negative for activity change, appetite change, chills and fever  HENT: Negative for ear pain, hearing loss, rhinorrhea, sneezing, sore throat and trouble swallowing  Eyes: Negative for pain and visual disturbance  Respiratory: Negative for cough, choking, chest tightness, shortness of breath, wheezing and stridor  Cardiovascular: Negative for chest pain, palpitations and leg swelling  Gastrointestinal: Positive for abdominal pain  Negative for constipation, diarrhea, nausea and vomiting  Genitourinary: Negative for difficulty urinating, dysuria, frequency, hematuria and testicular pain  Musculoskeletal: Negative for arthralgias, back pain, gait problem and neck pain  Skin: Negative for color change and rash  Allergic/Immunologic: Negative for immunocompromised state  Neurological: Negative for dizziness, seizures, syncope, speech difficulty, weakness, light-headedness, numbness and headaches  Psychiatric/Behavioral: Negative for sleep disturbance  The patient is not nervous/anxious  All other systems reviewed and are negative  Physical Exam  Physical Exam  Vitals and nursing note reviewed  Constitutional:       General: He is not in acute distress  Appearance: He is well-developed and normal weight  He is not ill-appearing, toxic-appearing or diaphoretic  HENT:      Head: Normocephalic and atraumatic        Nose: Nose normal       Mouth/Throat:      Mouth: Mucous membranes are moist    Eyes:      General: No scleral icterus  Extraocular Movements: Extraocular movements intact  Conjunctiva/sclera: Conjunctivae normal    Cardiovascular:      Rate and Rhythm: Normal rate and regular rhythm  Heart sounds: Normal heart sounds  No murmur heard  Pulmonary:      Effort: Pulmonary effort is normal  No respiratory distress  Breath sounds: Normal breath sounds  No wheezing or rales  Chest:      Chest wall: No tenderness  Abdominal:      General: Abdomen is flat  Bowel sounds are normal  There is no distension  There are no signs of injury  Palpations: Abdomen is soft  There is no hepatomegaly, splenomegaly, mass or pulsatile mass  Tenderness: There is no abdominal tenderness  There is no right CVA tenderness, left CVA tenderness, guarding or rebound  Negative signs include Salomon's sign and McBurney's sign  Hernia: No hernia is present  Musculoskeletal:         General: No tenderness or deformity  Normal range of motion  Cervical back: Normal range of motion and neck supple  Lymphadenopathy:      Cervical: No cervical adenopathy  Skin:     General: Skin is warm and dry  Capillary Refill: Capillary refill takes less than 2 seconds  Findings: No erythema or rash  Neurological:      General: No focal deficit present  Mental Status: He is alert and oriented to person, place, and time  Motor: No abnormal muscle tone  Psychiatric:         Mood and Affect: Mood normal  Mood is not anxious or depressed           Behavior: Behavior normal          Vital Signs  ED Triage Vitals [09/20/21 1516]   Temperature Pulse Respirations Blood Pressure SpO2   98 9 °F (37 2 °C) 87 18 134/75 98 %      Temp Source Heart Rate Source Patient Position - Orthostatic VS BP Location FiO2 (%)   Tympanic Monitor Sitting Right arm --      Pain Score       5           Vitals:    09/20/21 1516 09/20/21 1530 09/20/21 1600 09/20/21 1645   BP: 134/75 135/72 128/68 125/72   Pulse: 87 85 84 85 Patient Position - Orthostatic VS: Sitting Sitting Sitting Sitting         Visual Acuity      ED Medications  Medications   sodium chloride 0 9 % bolus 1,000 mL (0 mL Intravenous Stopped 9/20/21 1707)   iohexol (OMNIPAQUE) 350 MG/ML injection (SINGLE-DOSE) 100 mL (100 mL Intravenous Given 9/20/21 1633)       Diagnostic Studies  Results Reviewed     Procedure Component Value Units Date/Time    CMP [[id number]]  (Abnormal) Collected: 09/20/21 1546    Lab Status: Final result Specimen: Blood from Arm, Right Updated: 09/20/21 1612     Sodium 141 mmol/L      Potassium 3 8 mmol/L      Chloride 106 mmol/L      CO2 26 mmol/L      ANION GAP 9 mmol/L      BUN 19 mg/dL      Creatinine 0 93 mg/dL      Glucose 94 mg/dL      Calcium 8 6 mg/dL      AST 24 U/L      ALT 49 U/L      Alkaline Phosphatase 77 U/L      Total Protein 7 5 g/dL      Albumin 4 2 g/dL      Total Bilirubin 1 34 mg/dL      eGFR 111 ml/min/1 73sq m     Narrative:      [place] guidelines for Chronic Kidney Disease (CKD):     Stage 1 with normal or high GFR (GFR > 90 mL/min/1 73 square meters)    Stage 2 Mild CKD (GFR = 60-89 mL/min/1 73 square meters)    Stage 3A Moderate CKD (GFR = 45-59 mL/min/1 73 square meters)    Stage 3B Moderate CKD (GFR = 30-44 mL/min/1 73 square meters)    Stage 4 Severe CKD (GFR = 15-29 mL/min/1 73 square meters)    Stage 5 End Stage CKD (GFR <15 mL/min/1 73 square meters)  Note: GFR calculation is accurate only with a steady state creatinine    Lipase [[id number]]  (Normal) Collected: 09/20/21 1546    Lab Status: Final result Specimen: Blood from Arm, Right Updated: 09/20/21 1607     Lipase 98 u/L     CBC and differential [[id number]]  (Abnormal) Collected: 09/20/21 1546    Lab Status: Final result Specimen: Blood from Arm, Right Updated: 09/20/21 1554     WBC 10 45 Thousand/uL      RBC 4 98 Million/uL      Hemoglobin 14 6 g/dL      Hematocrit 44 9 %      MCV 90 fL      MCH 29 3 pg      MCHC 32 5 g/dL RDW 12 2 %      MPV 9 0 fL      Platelets 681 Thousands/uL      nRBC 0 /100 WBCs      Neutrophils Relative 67 %      Immat GRANS % 0 %      Lymphocytes Relative 21 %      Monocytes Relative 6 %      Eosinophils Relative 5 %      Basophils Relative 1 %      Neutrophils Absolute 6 93 Thousands/µL      Immature Grans Absolute 0 04 Thousand/uL      Lymphocytes Absolute 2 24 Thousands/µL      Monocytes Absolute 0 65 Thousand/µL      Eosinophils Absolute 0 53 Thousand/µL      Basophils Absolute 0 06 Thousands/µL                  CT Abdomen pelvis with contrast   Final Result by Tariq Macdonald MD (09/20 1657)      No acute pathology  No CT findings to account for the patient's symptoms  Workstation performed: [id number]                    Procedures  Procedures         ED Course                             SBIRT 20yo+      Most Recent Value   SBIRT (25 yo +)   In order to provide better care to our patients, we are screening all of our patients for alcohol and drug use  Would it be okay to ask you these screening questions? Yes Filed at: 09/20/2021 1517   Initial Alcohol Screen: US AUDIT-C    1  How often do you have a drink containing alcohol?  0 Filed at: 09/20/2021 1517   2  How many drinks containing alcohol do you have on a typical day you are drinking? 0 Filed at: 09/20/2021 1517   3a  Male UNDER 65: How often do you have five or more drinks on one occasion? 0 Filed at: 09/20/2021 1517   3b  FEMALE Any Age, or MALE 65+: How often do you have 4 or more drinks on one occassion? 0 Filed at: 09/20/2021 1517   Audit-C Score  0 Filed at: 09/20/2021 1517   ASCENCION: How many times in the past year have you    Used an illegal drug or used a prescription medication for non-medical reasons?   Never Filed at: 09/20/2021 1517                    MDM    Disposition  Final diagnoses:   Abdominal pain     Time reflects when diagnosis was documented in both MDM as applicable and the Disposition within this note Time User Action Codes Description Comment    9/20/2021  5:10 PM Bhumika Arredondo Add [R10 9] Abdominal pain       ED Disposition     ED Disposition Condition Date/Time Comment    Discharge Stable Mon Sep 20, 2021  5:10 PM Gene Landa discharge to home/self care  Follow-up Information     Follow up With Specialties Details Why Contact Marivel Matta DO Family Medicine Schedule an appointment as soon as possible for a visit   Carmela Cates  [address] [facility]  [phone]            Discharge Medication List as of 9/20/2021  5:15 PM      START taking these medications    Details   dicyclomine (BENTYL) 20 mg tablet Take 1 tablet (20 mg total) by mouth 2 (two) times a day, Starting Mon 9/20/2021, Normal         CONTINUE these medications which have NOT CHANGED    Details   diphenhydrAMINE (BENADRYL) 25 mg capsule Take 1 capsule (25 mg total) by mouth every 6 (six) hours as needed for itching, Starting Mon 9/10/2018, Print      famotidine (PEPCID) 20 mg tablet Take 1 tablet (20 mg total) by mouth 2 (two) times a day, Starting Mon 9/10/2018, Print      lidocaine (LIDODERM) 5 % Apply 1 patch topically daily Remove & Discard patch within 12 hours or as directed by MD, Starting Wed 4/7/2021, Normal      predniSONE 20 mg tablet Take 3 tablets (60 mg total) by mouth daily, Starting Mon 9/10/2018, Print           No discharge procedures on file      PDMP Review     None          ED Provider  Electronically Signed by           Tyrone Bustos DO  09/23/21 8425

## 2021-09-20 NOTE — Clinical Note
Alvarado Bates was seen and treated in our emergency department on 9/20/2021  Diagnosis:     Carolina Gibson  may return to work on return date  He may return on this date: 09/21/2021         If you have any questions or concerns, please don't hesitate to call        Fe Meraz DO    ______________________________           _______________          _______________  Hospital Representative                              Date                                Time

## 2022-01-05 ENCOUNTER — HOSPITAL ENCOUNTER (OUTPATIENT)
Dept: ULTRASOUND IMAGING | Facility: HOSPITAL | Age: 32
Discharge: HOME | End: 2022-01-05
Attending: FAMILY MEDICINE
Payer: COMMERCIAL

## 2022-01-05 DIAGNOSIS — R10.84 GENERALIZED ABDOMINAL PAIN: ICD-10-CM

## 2022-01-05 PROCEDURE — 76870 US EXAM SCROTUM: CPT

## 2022-01-10 PROCEDURE — U0005 INFEC AGEN DETEC AMPLI PROBE: HCPCS | Performed by: FAMILY MEDICINE

## 2022-01-10 PROCEDURE — U0003 INFECTIOUS AGENT DETECTION BY NUCLEIC ACID (DNA OR RNA); SEVERE ACUTE RESPIRATORY SYNDROME CORONAVIRUS 2 (SARS-COV-2) (CORONAVIRUS DISEASE [COVID-19]), AMPLIFIED PROBE TECHNIQUE, MAKING USE OF HIGH THROUGHPUT TECHNOLOGIES AS DESCRIBED BY CMS-2020-01-R: HCPCS | Performed by: FAMILY MEDICINE

## 2022-01-12 ENCOUNTER — TRANSCRIBE ORDERS (OUTPATIENT)
Dept: SURGERY | Facility: CLINIC | Age: 32
End: 2022-01-12

## 2022-01-12 ENCOUNTER — CONSULT (OUTPATIENT)
Dept: SURGERY | Facility: CLINIC | Age: 32
End: 2022-01-12
Payer: COMMERCIAL

## 2022-01-12 VITALS
OXYGEN SATURATION: 98 % | HEIGHT: 68 IN | WEIGHT: 186 LBS | TEMPERATURE: 98.8 F | BODY MASS INDEX: 28.19 KG/M2 | DIASTOLIC BLOOD PRESSURE: 80 MMHG | SYSTOLIC BLOOD PRESSURE: 120 MMHG | HEART RATE: 74 BPM | RESPIRATION RATE: 18 BRPM

## 2022-01-12 DIAGNOSIS — R10.84 GENERALIZED ABDOMINAL PAIN: ICD-10-CM

## 2022-01-12 DIAGNOSIS — K40.90 LEFT INGUINAL HERNIA: Primary | ICD-10-CM

## 2022-01-12 DIAGNOSIS — R10.84 GENERALIZED ABDOMINAL PAIN: Primary | ICD-10-CM

## 2022-01-12 PROCEDURE — 99244 OFF/OP CNSLTJ NEW/EST MOD 40: CPT | Performed by: SURGERY

## 2022-01-12 RX ORDER — SODIUM CHLORIDE, SODIUM LACTATE, POTASSIUM CHLORIDE, CALCIUM CHLORIDE 600; 310; 30; 20 MG/100ML; MG/100ML; MG/100ML; MG/100ML
125 INJECTION, SOLUTION INTRAVENOUS CONTINUOUS
Status: CANCELLED | OUTPATIENT
Start: 2022-01-12

## 2022-01-12 RX ORDER — CEFAZOLIN SODIUM 2 G/50ML
2000 SOLUTION INTRAVENOUS ONCE
Status: CANCELLED | OUTPATIENT
Start: 2022-03-08 | End: 2022-01-12

## 2022-01-12 NOTE — ASSESSMENT & PLAN NOTE
Patient is a pleasant 60-year-old male with no significant past medical history presenting with a 1 year history of an enlarging symptomatic left inguinal hernia for which definitive treatment by left inguinal herniorrhaphy with mesh is now indicated  On physical examination he is a well-nourished well-appearing male  He is in no acute distress  Pleasant competent reliable as a historian  Inspection and palpation of the abdomen reveals a reducible left inguinal hernia  No right inguinal hernia present no umbilical hernia present  Testes descended bilaterally  The diagnosis of left inguinal hernia was explained to the patient  The options benefits risks and alternatives to open left inguinal hernia repair with mesh were reviewed including risks related to anesthesia, bleeding, infection, the use of mesh, 1-3% lifetime risk of recurrence and 1-5% risk of chronic postoperative pain  Discussed robotic inguinal hernia repair as an alternative to the open approach  All questions answered to the satisfaction of the patient and informed consent taken to proceed

## 2022-01-12 NOTE — LETTER
January 12, 2022     [address]    Patient: Letty Bowman   YOB: 1990   Date of Visit: 1/12/2022       Dear Dr Ean Langston:    Thank you for referring Letty Bowman to me for evaluation  Below are my notes for this consultation  If you have questions, please do not hesitate to call me  I look forward to following your patient along with you  Sincerely,        Thien Tijerina MD        CC: No Recipients  Thien Tijerina MD  1/12/2022 10:50 AM  Sign when Signing Visit  Assessment/Plan:    Left inguinal hernia  Patient is a pleasant 61-year-old male with no significant past medical history presenting with a 1 year history of an enlarging symptomatic left inguinal hernia for which definitive treatment by left inguinal herniorrhaphy with mesh is now indicated  On physical examination he is a well-nourished well-appearing male  He is in no acute distress  Pleasant competent reliable as a historian  Inspection and palpation of the abdomen reveals a reducible left inguinal hernia  No right inguinal hernia present no umbilical hernia present  Testes descended bilaterally  The diagnosis of left inguinal hernia was explained to the patient  The options benefits risks and alternatives to open left inguinal hernia repair with mesh were reviewed including risks related to anesthesia, bleeding, infection, the use of mesh, 1-3% lifetime risk of recurrence and 1-5% risk of chronic postoperative pain  Discussed robotic inguinal hernia repair as an alternative to the open approach  All questions answered to the satisfaction of the patient and informed consent taken to proceed  Subjective:      Patient ID: Letty Bowman is a 28 y o  male  Patient came in today possible pre op hernia, left groin (ref by Dr Ean Langston, not rec'd yet)  Patient states he had the hernia for 2 years, he has on and off pain   And the hernia comes and goes  No fevers or chills       The following portions of the patient's history were reviewed and updated as appropriate: allergies, current medications, past family history, past medical history, past social history, past surgical history and problem list     Review of Systems   Constitutional: Negative for chills and fever  HENT: Negative for ear pain and sore throat  Eyes: Negative for pain and visual disturbance  Respiratory: Negative for cough and shortness of breath  Cardiovascular: Negative for chest pain and palpitations  Gastrointestinal: Negative for abdominal pain and vomiting  Genitourinary: Negative for dysuria and hematuria  Musculoskeletal: Negative for arthralgias and back pain  Skin: Negative for color change and rash  Neurological: Negative for seizures and syncope  All other systems reviewed and are negative  Objective:      /80 (BP Location: Left arm, Patient Position: Sitting, Cuff Size: Standard)   Pulse 74   Temp 98 8 °F (37 1 °C) (Temporal)   Resp 18   Ht 5' 8" (1 727 m)   Wt 84 4 kg (186 lb)   SpO2 98%   BMI 28 28 kg/m²          Physical Exam  Vitals and nursing note reviewed  Constitutional:       Appearance: He is well-developed  HENT:      Head: Normocephalic and atraumatic  Eyes:      Conjunctiva/sclera: Conjunctivae normal       Pupils: Pupils are equal, round, and reactive to light  Cardiovascular:      Rate and Rhythm: Normal rate and regular rhythm  Pulmonary:      Effort: Pulmonary effort is normal       Breath sounds: Normal breath sounds  Abdominal:      General: Bowel sounds are normal       Palpations: Abdomen is soft  Comments: Reducible left inguinal hernia present   Genitourinary:     Comments: Testes descended bilateral  Musculoskeletal:         General: Normal range of motion  Cervical back: Normal range of motion and neck supple  Skin:     General: Skin is warm and dry     Neurological: Mental Status: He is alert and oriented to person, place, and time  Psychiatric:         Behavior: Behavior normal          Thought Content:  Thought content normal          Judgment: Judgment normal

## 2022-01-12 NOTE — PROGRESS NOTES
Assessment/Plan:    Left inguinal hernia  Patient is a pleasant 81-year-old male with no significant past medical history presenting with a 1 year history of an enlarging symptomatic left inguinal hernia for which definitive treatment by left inguinal herniorrhaphy with mesh is now indicated  On physical examination he is a well-nourished well-appearing male  He is in no acute distress  Pleasant competent reliable as a historian  Inspection and palpation of the abdomen reveals a reducible left inguinal hernia  No right inguinal hernia present no umbilical hernia present  Testes descended bilaterally  The diagnosis of left inguinal hernia was explained to the patient  The options benefits risks and alternatives to open left inguinal hernia repair with mesh were reviewed including risks related to anesthesia, bleeding, infection, the use of mesh, 1-3% lifetime risk of recurrence and 1-5% risk of chronic postoperative pain  Discussed robotic inguinal hernia repair as an alternative to the open approach  All questions answered to the satisfaction of the patient and informed consent taken to proceed  Subjective:      Patient ID: Fredy Winn is a [address]53 [address] y o  male  Patient came in today possible pre op hernia, left groin (ref by Dr Dee Dee Campbell, not rec'd yet)  Patient states he had the hernia for 2 years, he has on and off pain  And the hernia comes and goes  No fevers or chills       The following portions of the patient's history were reviewed and updated as appropriate: allergies, current medications, past family history, past medical history, past social history, past surgical history and problem list     Review of Systems   Constitutional: Negative for chills and fever  HENT: Negative for ear pain and sore throat  Eyes: Negative for pain and visual disturbance  Respiratory: Negative for cough and shortness of breath  Cardiovascular: Negative for chest pain and palpitations  Gastrointestinal: Negative for abdominal pain and vomiting  Genitourinary: Negative for dysuria and hematuria  Musculoskeletal: Negative for arthralgias and back pain  Skin: Negative for color change and rash  Neurological: Negative for seizures and syncope  All other systems reviewed and are negative  Objective:      /80 (BP Location: Left arm, Patient Position: Sitting, Cuff Size: Standard)   Pulse 74   Temp 98 8 °F (37 1 °C) (Temporal)   Resp 18   Ht 5' 8" (1 727 m)   Wt 84 4 kg (186 lb)   SpO2 98%   BMI 28 28 kg/m²          Physical Exam  Vitals and nursing note reviewed  Constitutional:       Appearance: He is well-developed  HENT:      Head: Normocephalic and atraumatic  Eyes:      Conjunctiva/sclera: Conjunctivae normal       Pupils: Pupils are equal, round, and reactive to light  Cardiovascular:      Rate and Rhythm: Normal rate and regular rhythm  Pulmonary:      Effort: Pulmonary effort is normal       Breath sounds: Normal breath sounds  Abdominal:      General: Bowel sounds are normal       Palpations: Abdomen is soft  Comments: Reducible left inguinal hernia present   Genitourinary:     Comments: Testes descended bilateral  Musculoskeletal:         General: Normal range of motion  Cervical back: Normal range of motion and neck supple  Skin:     General: Skin is warm and dry  Neurological:      Mental Status: He is alert and oriented to person, place, and time  Psychiatric:         Behavior: Behavior normal          Thought Content:  Thought content normal          Judgment: Judgment normal

## 2022-01-12 NOTE — H&P
Assessment/Plan:    Left inguinal hernia  Patient is a pleasant 71-year-old male with no significant past medical history presenting with a 1 year history of an enlarging symptomatic left inguinal hernia for which definitive treatment by left inguinal herniorrhaphy with mesh is now indicated  On physical examination he is a well-nourished well-appearing male  He is in no acute distress  Pleasant competent reliable as a historian  Inspection and palpation of the abdomen reveals a reducible left inguinal hernia  No right inguinal hernia present no umbilical hernia present  Testes descended bilaterally  The diagnosis of left inguinal hernia was explained to the patient  The options benefits risks and alternatives to open left inguinal hernia repair with mesh were reviewed including risks related to anesthesia, bleeding, infection, the use of mesh, 1-3% lifetime risk of recurrence and 1-5% risk of chronic postoperative pain  Discussed robotic inguinal hernia repair as an alternative to the open approach  All questions answered to the satisfaction of the patient and informed consent taken to proceed  Subjective:      Patient ID: Laurel Valentin is a 28 y o  male  Patient came in today possible pre op hernia, left groin (ref by Dr Warner Sarabia, not rec'd yet)  Patient states he had the hernia for 2 years, he has on and off pain  And the hernia comes and goes  No fevers or chills       The following portions of the patient's history were reviewed and updated as appropriate: allergies, current medications, past family history, past medical history, past social history, past surgical history and problem list     Review of Systems   Constitutional: Negative for chills and fever  HENT: Negative for ear pain and sore throat  Eyes: Negative for pain and visual disturbance  Respiratory: Negative for cough and shortness of breath  Cardiovascular: Negative for chest pain and palpitations  Gastrointestinal: Negative for abdominal pain and vomiting  Genitourinary: Negative for dysuria and hematuria  Musculoskeletal: Negative for arthralgias and back pain  Skin: Negative for color change and rash  Neurological: Negative for seizures and syncope  All other systems reviewed and are negative  Objective:      /80 (BP Location: Left arm, Patient Position: Sitting, Cuff Size: Standard)   Pulse 74   Temp 98 8 °F (37 1 °C) (Temporal)   Resp 18   Ht 5' 8" (1 727 m)   Wt 84 4 kg (186 lb)   SpO2 98%   BMI 28 28 kg/m²          Physical Exam  Vitals and nursing note reviewed  Constitutional:       Appearance: He is well-developed  HENT:      Head: Normocephalic and atraumatic  Eyes:      Conjunctiva/sclera: Conjunctivae normal       Pupils: Pupils are equal, round, and reactive to light  Cardiovascular:      Rate and Rhythm: Normal rate and regular rhythm  Pulmonary:      Effort: Pulmonary effort is normal       Breath sounds: Normal breath sounds  Abdominal:      General: Bowel sounds are normal       Palpations: Abdomen is soft  Comments: Reducible left inguinal hernia present   Genitourinary:     Comments: Testes descended bilateral  Musculoskeletal:         General: Normal range of motion  Cervical back: Normal range of motion and neck supple  Skin:     General: Skin is warm and dry  Neurological:      Mental Status: He is alert and oriented to person, place, and time  Psychiatric:         Behavior: Behavior normal          Thought Content:  Thought content normal          Judgment: Judgment normal

## 2022-02-07 ENCOUNTER — ANESTHESIA EVENT (OUTPATIENT)
Dept: PERIOP | Facility: HOSPITAL | Age: 32
End: 2022-02-07
Payer: COMMERCIAL

## 2022-02-08 RX ORDER — MULTIVITAMIN
1 TABLET ORAL DAILY
COMMUNITY

## 2022-02-08 RX ORDER — ACETAMINOPHEN 500 MG
500 TABLET ORAL EVERY 6 HOURS PRN
COMMUNITY

## 2022-02-08 NOTE — PRE-PROCEDURE INSTRUCTIONS
Pre-Surgery Instructions:   Medication Instructions    acetaminophen (TYLENOL) 500 mg tablet Instructed patient per Anesthesia Guidelines   CREATINE PO Instructed patient per Anesthesia Guidelines   diphenhydrAMINE (BENADRYL) 25 mg capsule Instructed patient per Anesthesia Guidelines   Glucosamine-Chondroitin (GLUCOSAMINE CHONDR COMPLEX PO) Instructed patient per Anesthesia Guidelines   Green Tea, Kayla sinensis, (GREEN TEA EXTRACT PO) Instructed patient per Anesthesia Guidelines   Multiple Vitamin (multivitamin) tablet Instructed patient per Anesthesia Guidelines   Omega-3 Fatty Acids (OMEGA 3 PO) Instructed patient per Anesthesia Guidelines   Probiotic Product (PROBIOTIC DAILY PO) Instructed patient per Anesthesia Guidelines  Patient  instructed may take*tylenol if needed*with a sip of water the morning of surgery  Patient given/ instructed on use of chlorhexidine soap per hospital protocol    Patient instructed to stop all ASA, NSAIDS(at least 3 days), vitamins and herbal supplements one week prior to surgery or per Dr She Allen

## 2022-02-09 ENCOUNTER — TELEPHONE (OUTPATIENT)
Dept: SURGERY | Facility: CLINIC | Age: 32
End: 2022-02-09

## 2022-02-09 NOTE — TELEPHONE ENCOUNTER
Left message for patient to call our office  Patient had COVID (postive as of 01/10/2022), per anesthesia guidelines his surgery should be rescheduled at least 7 weeks out  Patient called back, we are rescheduling him to 03/08/2022

## 2022-02-15 ENCOUNTER — ANESTHESIA (OUTPATIENT)
Dept: PERIOP | Facility: HOSPITAL | Age: 32
End: 2022-02-15
Payer: COMMERCIAL

## 2022-03-07 NOTE — ANESTHESIA PREPROCEDURE EVALUATION
Procedure:  REPAIR HERNIA INGUINALwith Mesh (Left Groin)    Relevant Problems   No relevant active problems        Physical Exam    Airway    Mallampati score: II  TM Distance: >3 FB  Neck ROM: full     Dental   No notable dental hx     Cardiovascular  Rhythm: regular, Rate: normal, Cardiovascular exam normal    Pulmonary  Pulmonary exam normal Breath sounds clear to auscultation,     Other Findings        Anesthesia Plan  ASA Score- 2     Anesthesia Type- general with ASA Monitors  Additional Monitors:   Airway Plan: LMA  Plan Factors-Exercise tolerance (METS): >4 METS  Chart reviewed  Existing labs reviewed  Patient summary reviewed  Patient is not a current smoker  Induction- intravenous  Postoperative Plan-     Informed Consent- Anesthetic plan and risks discussed with patient  I personally reviewed this patient with the CRNA  Discussed and agreed on the Anesthesia Plan with the CRNA  Enrique Schmidt

## 2022-03-08 ENCOUNTER — HOSPITAL ENCOUNTER (OUTPATIENT)
Facility: HOSPITAL | Age: 32
Setting detail: OUTPATIENT SURGERY
Discharge: HOME | End: 2022-03-08
Attending: SURGERY | Admitting: SURGERY
Payer: COMMERCIAL

## 2022-03-08 VITALS
HEART RATE: 72 BPM | WEIGHT: 180 LBS | TEMPERATURE: 98.3 F | OXYGEN SATURATION: 100 % | SYSTOLIC BLOOD PRESSURE: 135 MMHG | RESPIRATION RATE: 16 BRPM | DIASTOLIC BLOOD PRESSURE: 65 MMHG | HEIGHT: 68 IN | BODY MASS INDEX: 27.28 KG/M2

## 2022-03-08 DIAGNOSIS — K40.90 LEFT INGUINAL HERNIA: Primary | ICD-10-CM

## 2022-03-08 PROCEDURE — NC001 PR NO CHARGE: Performed by: SURGERY

## 2022-03-08 PROCEDURE — 49505 PRP I/HERN INIT REDUC >5 YR: CPT | Performed by: PHYSICIAN ASSISTANT

## 2022-03-08 PROCEDURE — 49505 PRP I/HERN INIT REDUC >5 YR: CPT | Performed by: SURGERY

## 2022-03-08 PROCEDURE — C1781 MESH (IMPLANTABLE): HCPCS | Performed by: SURGERY

## 2022-03-08 DEVICE — BARD MESH PERFIX PLUG, LARGE
Type: IMPLANTABLE DEVICE | Site: INGUINAL | Status: FUNCTIONAL
Brand: BARD MESH PERFIX PLUG

## 2022-03-08 RX ORDER — ONDANSETRON 2 MG/ML
4 INJECTION INTRAMUSCULAR; INTRAVENOUS ONCE AS NEEDED
Status: DISCONTINUED | OUTPATIENT
Start: 2022-03-08 | End: 2022-03-08 | Stop reason: HOSPADM

## 2022-03-08 RX ORDER — HYDROCODONE BITARTRATE AND ACETAMINOPHEN 5; 325 MG/1; MG/1
2 TABLET ORAL EVERY 6 HOURS PRN
Status: DISCONTINUED | OUTPATIENT
Start: 2022-03-08 | End: 2022-03-08 | Stop reason: HOSPADM

## 2022-03-08 RX ORDER — SODIUM CHLORIDE, SODIUM LACTATE, POTASSIUM CHLORIDE, CALCIUM CHLORIDE 600; 310; 30; 20 MG/100ML; MG/100ML; MG/100ML; MG/100ML
75 INJECTION, SOLUTION INTRAVENOUS CONTINUOUS
Status: DISCONTINUED | OUTPATIENT
Start: 2022-03-08 | End: 2022-03-08 | Stop reason: HOSPADM

## 2022-03-08 RX ORDER — PROPOFOL 10 MG/ML
INJECTION, EMULSION INTRAVENOUS AS NEEDED
Status: DISCONTINUED | OUTPATIENT
Start: 2022-03-08 | End: 2022-03-08

## 2022-03-08 RX ORDER — HYDROCODONE BITARTRATE AND ACETAMINOPHEN 5; 325 MG/1; MG/1
1 TABLET ORAL EVERY 6 HOURS PRN
Status: DISCONTINUED | OUTPATIENT
Start: 2022-03-08 | End: 2022-03-08 | Stop reason: HOSPADM

## 2022-03-08 RX ORDER — MAGNESIUM HYDROXIDE 1200 MG/15ML
LIQUID ORAL AS NEEDED
Status: DISCONTINUED | OUTPATIENT
Start: 2022-03-08 | End: 2022-03-08 | Stop reason: HOSPADM

## 2022-03-08 RX ORDER — DEXAMETHASONE SODIUM PHOSPHATE 10 MG/ML
INJECTION, SOLUTION INTRAMUSCULAR; INTRAVENOUS AS NEEDED
Status: DISCONTINUED | OUTPATIENT
Start: 2022-03-08 | End: 2022-03-08

## 2022-03-08 RX ORDER — HYDROMORPHONE HCL/PF 1 MG/ML
0.2 SYRINGE (ML) INJECTION EVERY 4 HOURS PRN
Status: DISCONTINUED | OUTPATIENT
Start: 2022-03-08 | End: 2022-03-08 | Stop reason: HOSPADM

## 2022-03-08 RX ORDER — FENTANYL CITRATE 50 UG/ML
INJECTION, SOLUTION INTRAMUSCULAR; INTRAVENOUS AS NEEDED
Status: DISCONTINUED | OUTPATIENT
Start: 2022-03-08 | End: 2022-03-08

## 2022-03-08 RX ORDER — KETOROLAC TROMETHAMINE 30 MG/ML
INJECTION, SOLUTION INTRAMUSCULAR; INTRAVENOUS AS NEEDED
Status: DISCONTINUED | OUTPATIENT
Start: 2022-03-08 | End: 2022-03-08

## 2022-03-08 RX ORDER — ACETAMINOPHEN 325 MG/1
650 TABLET ORAL EVERY 6 HOURS PRN
Status: DISCONTINUED | OUTPATIENT
Start: 2022-03-08 | End: 2022-03-08 | Stop reason: HOSPADM

## 2022-03-08 RX ORDER — MIDAZOLAM HYDROCHLORIDE 2 MG/2ML
INJECTION, SOLUTION INTRAMUSCULAR; INTRAVENOUS AS NEEDED
Status: DISCONTINUED | OUTPATIENT
Start: 2022-03-08 | End: 2022-03-08

## 2022-03-08 RX ORDER — SODIUM CHLORIDE, SODIUM LACTATE, POTASSIUM CHLORIDE, CALCIUM CHLORIDE 600; 310; 30; 20 MG/100ML; MG/100ML; MG/100ML; MG/100ML
125 INJECTION, SOLUTION INTRAVENOUS CONTINUOUS
Status: DISCONTINUED | OUTPATIENT
Start: 2022-03-08 | End: 2022-03-08

## 2022-03-08 RX ORDER — SODIUM CHLORIDE, SODIUM LACTATE, POTASSIUM CHLORIDE, CALCIUM CHLORIDE 600; 310; 30; 20 MG/100ML; MG/100ML; MG/100ML; MG/100ML
125 INJECTION, SOLUTION INTRAVENOUS CONTINUOUS
Status: ACTIVE | OUTPATIENT
Start: 2022-03-08 | End: 2022-03-08

## 2022-03-08 RX ORDER — CEFAZOLIN SODIUM 2 G/50ML
2000 SOLUTION INTRAVENOUS ONCE
Status: COMPLETED | OUTPATIENT
Start: 2022-03-08 | End: 2022-03-08

## 2022-03-08 RX ORDER — FENTANYL CITRATE/PF 50 MCG/ML
25 SYRINGE (ML) INJECTION
Status: DISCONTINUED | OUTPATIENT
Start: 2022-03-08 | End: 2022-03-08 | Stop reason: HOSPADM

## 2022-03-08 RX ORDER — LIDOCAINE HYDROCHLORIDE 10 MG/ML
INJECTION, SOLUTION EPIDURAL; INFILTRATION; INTRACAUDAL; PERINEURAL AS NEEDED
Status: DISCONTINUED | OUTPATIENT
Start: 2022-03-08 | End: 2022-03-08

## 2022-03-08 RX ORDER — BUPIVACAINE HYDROCHLORIDE 5 MG/ML
INJECTION, SOLUTION PERINEURAL AS NEEDED
Status: DISCONTINUED | OUTPATIENT
Start: 2022-03-08 | End: 2022-03-08 | Stop reason: HOSPADM

## 2022-03-08 RX ORDER — HYDROCODONE BITARTRATE AND ACETAMINOPHEN 5; 325 MG/1; MG/1
1 TABLET ORAL EVERY 6 HOURS PRN
Qty: 12 TABLET | Refills: 0 | Status: SHIPPED | OUTPATIENT
Start: 2022-03-08 | End: 2022-03-18

## 2022-03-08 RX ORDER — LIDOCAINE HYDROCHLORIDE AND EPINEPHRINE 10; 10 MG/ML; UG/ML
INJECTION, SOLUTION INFILTRATION; PERINEURAL AS NEEDED
Status: DISCONTINUED | OUTPATIENT
Start: 2022-03-08 | End: 2022-03-08 | Stop reason: HOSPADM

## 2022-03-08 RX ORDER — ONDANSETRON 2 MG/ML
INJECTION INTRAMUSCULAR; INTRAVENOUS AS NEEDED
Status: DISCONTINUED | OUTPATIENT
Start: 2022-03-08 | End: 2022-03-08

## 2022-03-08 RX ORDER — SODIUM CHLORIDE, SODIUM LACTATE, POTASSIUM CHLORIDE, CALCIUM CHLORIDE 600; 310; 30; 20 MG/100ML; MG/100ML; MG/100ML; MG/100ML
125 INJECTION, SOLUTION INTRAVENOUS CONTINUOUS
Status: DISCONTINUED | OUTPATIENT
Start: 2022-03-08 | End: 2022-03-08 | Stop reason: HOSPADM

## 2022-03-08 RX ADMIN — HYDROCODONE BITARTRATE AND ACETAMINOPHEN 1 TABLET: 5; 325 TABLET ORAL at 12:47

## 2022-03-08 RX ADMIN — SODIUM CHLORIDE, SODIUM LACTATE, POTASSIUM CHLORIDE, AND CALCIUM CHLORIDE 125 ML/HR: .6; .31; .03; .02 INJECTION, SOLUTION INTRAVENOUS at 09:44

## 2022-03-08 RX ADMIN — MIDAZOLAM 2 MG: 1 INJECTION INTRAMUSCULAR; INTRAVENOUS at 10:52

## 2022-03-08 RX ADMIN — FENTANYL CITRATE 25 MCG: 50 INJECTION, SOLUTION INTRAMUSCULAR; INTRAVENOUS at 11:20

## 2022-03-08 RX ADMIN — FENTANYL CITRATE 50 MCG: 50 INJECTION, SOLUTION INTRAMUSCULAR; INTRAVENOUS at 10:57

## 2022-03-08 RX ADMIN — PROPOFOL 200 MG: 10 INJECTION, EMULSION INTRAVENOUS at 10:57

## 2022-03-08 RX ADMIN — FENTANYL CITRATE 25 MCG: 50 INJECTION, SOLUTION INTRAMUSCULAR; INTRAVENOUS at 11:29

## 2022-03-08 RX ADMIN — KETOROLAC TROMETHAMINE 30 MG: 30 INJECTION, SOLUTION INTRAMUSCULAR; INTRAVENOUS at 11:45

## 2022-03-08 RX ADMIN — LIDOCAINE HYDROCHLORIDE 100 MG: 10 INJECTION, SOLUTION EPIDURAL; INFILTRATION; INTRACAUDAL at 10:57

## 2022-03-08 RX ADMIN — ONDANSETRON 4 MG: 2 INJECTION INTRAMUSCULAR; INTRAVENOUS at 11:45

## 2022-03-08 RX ADMIN — DEXAMETHASONE SODIUM PHOSPHATE 8 MG: 10 INJECTION INTRAMUSCULAR; INTRAVENOUS at 11:12

## 2022-03-08 RX ADMIN — SODIUM CHLORIDE, SODIUM LACTATE, POTASSIUM CHLORIDE, AND CALCIUM CHLORIDE: .6; .31; .03; .02 INJECTION, SOLUTION INTRAVENOUS at 11:45

## 2022-03-08 RX ADMIN — CEFAZOLIN SODIUM 2000 MG: 2 SOLUTION INTRAVENOUS at 10:49

## 2022-03-08 NOTE — ANESTHESIA POSTPROCEDURE EVALUATION
Post-Op Assessment Note    CV Status:  Stable  Pain Score: 0    Pain management: adequate     Mental Status:  Sleepy   Hydration Status:  Stable   PONV Controlled:  None   Airway Patency:  Patent      Post Op Vitals Reviewed: Yes      Staff: CRNA         No complications documented      BP   152/73   Temp   98 9   Pulse  75   Resp   14   SpO2   100%

## 2022-03-08 NOTE — DISCHARGE INSTRUCTIONS
After An Inguinal Hernia Repair   AMBULATORY CARE:   Call 911 for any of the following:   · You feel lightheaded, short of breath, and have chest pain  · You cough up blood  · You have trouble breathing  Seek care immediately if:   · Your arm or leg feels warm, tender, and painful  It may look swollen and red  · Blood soaks through your bandage  · Your abdomen or groin feels hard and looks bigger than usual      · Your bruise suddenly gets bigger  · Your bowel movements are black, bloody, or tarry-looking  Contact your healthcare provider if:   · You have a fever above 101°F      · You develop a skin rash, hives, or itching  · Your incision is swollen, red, or draining pus or fluid  · You have nausea, or you are vomiting  · You cannot have a bowel movement  · You have trouble urinating  · Your pain does not get better after you take pain medicine  · You have questions or concerns about your condition or care  Medicines: You may need any of the following:  · Prescription pain medicine  may be given  Ask your healthcare provider how to take this medicine safely  Some prescription pain medicines contain acetaminophen  Do not take other medicines that contain acetaminophen without talking to your healthcare provider  Too much acetaminophen may cause liver damage  Prescription pain medicine may cause constipation  Ask your healthcare provider how to prevent or treat constipation  · NSAIDs , such as ibuprofen, help decrease swelling, pain, and fever  This medicine is available with or without a doctor's order  NSAIDs can cause stomach bleeding or kidney problems in certain people  If you take blood thinner medicine, always ask your healthcare provider if NSAIDs are safe for you  Always read the medicine label and follow directions  · Acetaminophen  decreases pain and fever  It is available without a doctor's order  Ask how much to take and how often to take it  Follow directions  Read the labels of all other medicines you are using to see if they also contain acetaminophen, or ask your doctor or pharmacist  Acetaminophen can cause liver damage if not taken correctly  Do not use more than 4 grams (4,000 milligrams) total of acetaminophen in one day  · Take your medicine as directed  Contact your healthcare provider if you think your medicine is not helping or if you have side effects  Tell him or her if you are allergic to any medicine  Keep a list of the medicines, vitamins, and herbs you take  Include the amounts, and when and why you take them  Bring the list or the pill bottles to follow-up visits  Carry your medicine list with you in case of an emergency  Bathing: You can shower in 48 hours  Remove your bandage before you shower  It is normal to see a small amount of blood under the bandage  Carefully wash around your wound  It is okay to let soap and water run over your wound  Do not  scrub your wound  Gently pay your wound dry  Care for your wound as directed: If you have strips of medical tape over your incision, allow them to fall off on their own  It may take 7 to 10 days for them to fall off  Do not put powders, lotions, or creams on your wound  They may cause your wound to get infected  Do not get in a bathtub, swimming pool, or hot tub until your healthcare provider says it is okay  Check your wound every day for signs of infection, such as redness, swelling, or pus  Bruising is normal and expected  Men may have bruising and swelling in the scrotum  Take deep breaths and cough:  Do this 10 times each hour  This will decrease your risk for a lung infection  Take a deep breath and hold it for as long as you can  Let the air out and then cough strongly  Deep breaths help open your airway  You may be given an incentive spirometer to help you take deep breaths  Put the plastic piece in your mouth and take a slow, deep breath   Then let the air out and cough  Repeat these steps 10 times every hour  Use a pillow as a splint:  Press a pillow lightly against your incision when you cough, move, or get out of bed  This may decrease pain or discomfort  You may need another person to help you get in and out of bed, a chair, or off the toilet  Activity:  Get out of bed and walk the day after your surgery  This will help prevent blood clots, move your bowels after surgery, and increase healing  Start with short walks around the house  Gradually walk further each day  Do not play sports for 2 to 3 weeks  Do not lift anything heavier than 5 pounds until your healthcare provider says it is okay  This may put too much pressure on your incision and cause it to come apart  It may also increase your risk for another hernia  Drink liquids as directed:  Liquids may prevent constipation and straining during a bowel movement  This will help prevent pressure on your incision, and prevent another hernia  Ask how much liquid to drink each day and which liquids are best for you  Decrease swelling:   · Apply ice  on your incision for 15 to 20 minutes every hour or as directed  Use an ice pack, or put crushed ice in a plastic bag  Cover it with a towel  Ice helps prevent tissue damage and decreases swelling and pain  · Elevate your scrotum  on a towel  Lie in bed  Roll a small towel and place it under your scrotum  This will help decrease swelling and bruising  Driving:  Do not drive for at least 1 week after surgery  Do not drive if you are taking prescription pain medication  Do not drive until it is comfortable to wear a seatbelt across your abdomen  Ask your healthcare provider when it is safe for you to drive  Return to work or school: You may be able to return to work or school in 50 to 67 hours  You may need to stay out of work if longer you have to lift heavy items at work     Do not smoke:  Nicotine and other chemicals in cigarettes and cigars can prevent your wound from healing  It can also increase your risk for another inguinal hernia  Ask your healthcare provider for information if you currently smoke and need help to quit  E-cigarettes or smokeless tobacco still contain nicotine  Talk to your healthcare provider before you use these products  Follow up with your healthcare provider as directed:  Write down your questions so you remember to ask them during your visits  © Copyright [employer] 2022 Information is for End User's use only and may not be sold, redistributed or otherwise used for commercial purposes  All illustrations and images included in CareNotes® are the copyrighted property of [employer]  or [facility] Nicholas Villagomez   The above information is an  only  It is not intended as medical advice for individual conditions or treatments  Talk to your doctor, nurse or pharmacist before following any medical regimen to see if it is safe and effective for you

## 2022-03-08 NOTE — H&P
H&P Exam - General Surgery   Ramon Matos 28 y o  male MRN: [id number]  Unit/Bed#: OR [place] Encounter: [id number]    Assessment/Plan     Assessment:  The patient is a 29 yo male with a left inguinal hernia for definitive treatment  Plan:  O/B/R/A to surgery reviewed and consent obtained  History of Present Illness   HPI:  Ramon Matos is a 28 y o  male who presents with a left inguinal hernia  Review of Systems   Constitutional: Negative for chills and fever  HENT: Negative for ear pain and sore throat  Eyes: Negative for pain and visual disturbance  Respiratory: Negative for cough and shortness of breath  Cardiovascular: Negative for chest pain and palpitations  Gastrointestinal: Negative for abdominal pain and vomiting  Genitourinary: Negative for dysuria and hematuria  Musculoskeletal: Negative for arthralgias and back pain  Skin: Negative for color change and rash  Neurological: Negative for seizures and syncope  All other systems reviewed and are negative        Historical Information   Past Medical History:   Diagnosis Date    Asthma     per pt age 11 or 10, no issues since    Exercises 5 to 6 times per week     3 x/week heavy body work out and other days cardio    Hearing deficit     left ear "feels muffled"    Left inguinal hernia     Personal history of COVID-19 01/10/2022    recovered at home/chief symptom per pt fatigue/ Not vaccinated    Pollen allergy     Red-green color blindness     Wears glasses      Past Surgical History:   Procedure Laterality Date    DENTAL SURGERY  2 years ago     Social History   Social History     Substance and Sexual Activity   Alcohol Use No     Social History     Substance and Sexual Activity   Drug Use No     Social History     Tobacco Use   Smoking Status Never Smoker   Smokeless Tobacco Never Used     E-Cigarette/Vaping    E-Cigarette Use Never User      E-Cigarette/Vaping Substances    Nicotine No     THC No     CBD No     Flavoring No     Other No     Unknown No      Family History: non-contributory    Meds/Allergies   all medications and allergies reviewed  No Known Allergies    Objective   First Vitals:   Blood Pressure: 129/78 (03/08/22 0936)  Pulse: 71 (03/08/22 0936)  Temperature: 98 4 °F (36 9 °C) (03/08/22 0936)  Temp Source: Temporal (03/08/22 0936)  Respirations: 16 (03/08/22 0936)  Height: 5' 8" (172 7 cm) (02/08/22 1447)  Weight - Scale: 81 6 kg (180 lb) (02/08/22 1447)  SpO2: 100 % (03/08/22 0936)    Current Vitals:   Blood Pressure: 129/78 (03/08/22 0936)  Pulse: 71 (03/08/22 0936)  Temperature: 98 4 °F (36 9 °C) (03/08/22 0936)  Temp Source: Temporal (03/08/22 0936)  Respirations: 16 (03/08/22 0936)  Height: 5' 8" (172 7 cm) (03/08/22 0936)  Weight - Scale: 81 6 kg (180 lb) (03/08/22 0936)  SpO2: 100 % (03/08/22 0936)    No intake or output data in the 24 hours ending 03/08/22 1048    Invasive Devices  Report    Peripheral Intravenous Line            Peripheral IV 03/08/22 Dorsal (posterior); Left Hand <1 day                Physical Exam  Vitals and nursing note reviewed  Constitutional:       Appearance: He is well-developed  HENT:      Head: Normocephalic and atraumatic  Eyes:      Conjunctiva/sclera: Conjunctivae normal    Cardiovascular:      Rate and Rhythm: Normal rate and regular rhythm  Heart sounds: No murmur heard  Pulmonary:      Effort: Pulmonary effort is normal  No respiratory distress  Breath sounds: Normal breath sounds  Abdominal:      Palpations: Abdomen is soft  Tenderness: There is no abdominal tenderness  Comments: Left inguinal hernia present   Musculoskeletal:      Cervical back: Neck supple  Skin:     General: Skin is warm and dry  Neurological:      Mental Status: He is alert  Lab Results: I have personally reviewed pertinent lab results  Imaging: I have personally reviewed pertinent reports      EKG, Pathology, and Other Studies: I have personally reviewed pertinent reports  Code Status: No Order  Advance Directive and Living Will:      Power of :    POLST:      Counseling / Coordination of Care  Total floor / unit time spent today 35 minutes  Greater than 50% of total time was spent with the patient and / or family counseling and / or coordination of care  A description of the counseling / coordination of care: 15

## 2022-03-08 NOTE — DISCHARGE INSTR - AVS FIRST PAGE
No driving x 1 week  No heavy lifting x 2 weeks  Max 10 lbs x 2 weeks, then 20 lbs x 2 weeks, then 40 lbs x 2 weeks  Can gently peel skin glue when it begins to flake, after about 1 week

## 2022-03-08 NOTE — OP NOTE
OPERATIVE REPORT  PATIENT NAME: Zenaida Yoder    :  1990  MRN: [id number]  Pt Location: [facility] ROOM 01    SURGERY DATE: 3/8/2022    Surgeon(s) and Role:     Zoe Dunbar MD - Primary     * Richard Conner PA-C - Assisting  The PA was necessary to provide expert assistance; i e  in the form of providing optimal exposure with retraction, suturing, and assistance with dissection in order to perform the most efficient operation and in order to optimize patient safety in the abscence of a qualified surgical resident  Preop Diagnosis:  Left inguinal hernia [K40 90]    Post-Op Diagnosis Codes:     * Left inguinal hernia [K40 90]    Procedure(s) (LRB):  REPAIR HERNIA INGUINALwith Mesh (Left)    Specimen(s):  * No specimens in log *    Estimated Blood Loss:   3 mL    Drains:  * No LDAs found *    Anesthesia Type:   General/LMA    Operative Indications:  Left inguinal hernia [K40 90]  The patient is a pleasant 25-year-old male presenting with a symptomatic left inguinal hernia for which definitive treatment by open mesh repair is now indicated  Operative Findings: At the time of surgery the patient was found to have an indirect left inguinal hernia  There was no direct component noted  During the course of the dissection the critical structures were identified and preserved  The ilioinguinal nerve was identified and preserved as were the cord structures  A plug and patch technique used for the repair  The procedure completed uneventfully and the left testicle palpated in the left hemiscrotum at the procedures conclusion  Complications:   None    Procedure and Technique:  The patient was taken to the operating room where they are properly identified, monitored and anesthetized  They received antibiotics perioperatively  Venodynes used for DVT prophylaxis  Time-out performed  Skin infiltrated with 1% lidocaine local anesthesia with epinephrine  Skin incised    Dissection carried down through Vanessa's to the aponeurosis of the external oblique which was opened to the external ring  Leaves developed superiorly and inferiorly  Self-retaining retractor placed  The spermatic cord dissected out at the pubic tubercle and controlled with a Penrose drain  The indirect sac readily identified, high dissection of the sac carried back to the internal ring after which the sac was reduced through the internal ring  A Bard PerFix plug was placed through the internal ring and secured circumferentially with interrupted 2 0 Vicryl suture  The onlay mesh placed over the floor the canal and around the internal ring  The aponeurosis of the external oblique closed over top of the repair with a running 3 0 Vicryl suture  The wound irrigated and closed in layers with interrupted 3 0 Vicryl on Vanessa's and running subcuticular 4 Monocryl suture on skin  The wounds infiltrated with 0 5% Marcaine  Dressed  Patient extubated and taken to recovery in stable condition       I was present for the entire procedure    Patient Disposition:  PACU       SIGNATURE: Nu Mireles MD  DATE: March 8, 2022  TIME: 11:47 AM

## 2022-03-09 ENCOUNTER — TELEPHONE (OUTPATIENT)
Dept: SURGERY | Facility: CLINIC | Age: 32
End: 2022-03-09

## 2022-03-23 ENCOUNTER — OFFICE VISIT (OUTPATIENT)
Dept: SURGERY | Facility: CLINIC | Age: 32
End: 2022-03-23

## 2022-03-23 VITALS — TEMPERATURE: 98.7 F

## 2022-03-23 DIAGNOSIS — K40.90 LEFT INGUINAL HERNIA: Primary | ICD-10-CM

## 2022-03-23 PROCEDURE — 99024 POSTOP FOLLOW-UP VISIT: CPT | Performed by: PHYSICIAN ASSISTANT

## 2022-03-23 NOTE — PROGRESS NOTES
Post-Op Note - General Surgery   Sarath Oneal 28 y o  male MRN: [id number]  Encounter: [id number]    Assessment/Plan    Left inguinal hernia  Doing well 2 weeks status post left inguinal hernia repair mesh  On exam well-appearing, incision well healed without evidence of infection or early recurrence  Postoperative restrictions reviewed  Copy of the op note provided  All questions answered, will see back as needed  Diagnoses and all orders for this visit:    Left inguinal hernia        Subjective      Chief Complaint   Patient presents with    Post-op     s/p [facility] rpr 03/08/2022     Patient is here today in follow up to his s/p [facility] 3/8/2022  States that he did have some swelling after his surgery,but this is subsiding  Denies fever,chills, nausea or vomiting  Jl Ricks 81-year-old male here for follow-up 2 weeks after open left inguinal hernia repair with mesh  Reports feeling well  Postop pain requiring Norco x4 days, now controlled with Tylenol/Motrin as needed  Tolerating regular diet nausea or vomiting  Having regular formed bowel movements  No fevers chills or difficulty breathing  Review of Systems    The following portions of the patient's history were reviewed and updated as appropriate: allergies, current medications, past family history, past medical history, past social history, past surgical history and problem list     Objective      Temperature 98 7 °F (37 1 °C), temperature source Temporal    Physical Exam  Vitals and nursing note reviewed  Constitutional:       General: He is not in acute distress  Appearance: He is well-developed  He is not diaphoretic  HENT:      Head: Normocephalic and atraumatic  Eyes:      Conjunctiva/sclera: Conjunctivae normal       Pupils: Pupils are equal, round, and reactive to light  Pulmonary:      Effort: No respiratory distress  Genitourinary:      Musculoskeletal:         General: Normal range of motion  Cervical back: Normal range of motion  Skin:     General: Skin is warm and dry  Capillary Refill: Capillary refill takes less than 2 seconds  Neurological:      Mental Status: He is alert and oriented to person, place, and time     Psychiatric:         Behavior: Behavior normal          Signature:  Richard Conner PA-C  Date: 3/23/2022 Time: 1:54 PM

## 2022-03-23 NOTE — ASSESSMENT & PLAN NOTE
Doing well 2 weeks status post left inguinal hernia repair mesh  On exam well-appearing, incision well healed without evidence of infection or early recurrence  Postoperative restrictions reviewed  Copy of the op note provided  All questions answered, will see back as needed

## 2022-10-02 ENCOUNTER — APPOINTMENT (OUTPATIENT)
Dept: LAB | Facility: HOSPITAL | Age: 32
End: 2022-10-02
Attending: FAMILY MEDICINE
Payer: COMMERCIAL

## 2022-10-02 DIAGNOSIS — R53.83 FATIGUE, UNSPECIFIED TYPE: ICD-10-CM

## 2022-10-02 LAB
ALBUMIN SERPL BCP-MCNC: 3.9 G/DL (ref 3.5–5)
ALP SERPL-CCNC: 66 U/L (ref 46–116)
ALT SERPL W P-5'-P-CCNC: 84 U/L (ref 12–78)
ANION GAP SERPL CALCULATED.3IONS-SCNC: 6 MMOL/L (ref 4–13)
AST SERPL W P-5'-P-CCNC: 35 U/L (ref 5–45)
BILIRUB SERPL-MCNC: 1.54 MG/DL (ref 0.2–1)
BUN SERPL-MCNC: 22 MG/DL (ref 5–25)
CALCIUM SERPL-MCNC: 8.7 MG/DL (ref 8.3–10.1)
CHLORIDE SERPL-SCNC: 105 MMOL/L (ref 96–108)
CO2 SERPL-SCNC: 31 MMOL/L (ref 21–32)
CREAT SERPL-MCNC: 1.04 MG/DL (ref 0.6–1.3)
ERYTHROCYTE [DISTWIDTH] IN BLOOD BY AUTOMATED COUNT: 11.7 % (ref 11.6–15.1)
GFR SERPL CREATININE-BSD FRML MDRD: 95 ML/MIN/1.73SQ M
GLUCOSE P FAST SERPL-MCNC: 93 MG/DL (ref 65–99)
HCT VFR BLD AUTO: 45.2 % (ref 36.5–49.3)
HGB BLD-MCNC: 15.1 G/DL (ref 12–17)
MCH RBC QN AUTO: 30.1 PG (ref 26.8–34.3)
MCHC RBC AUTO-ENTMCNC: 33.4 G/DL (ref 31.4–37.4)
MCV RBC AUTO: 90 FL (ref 82–98)
PLATELET # BLD AUTO: 218 THOUSANDS/UL (ref 149–390)
PMV BLD AUTO: 8.7 FL (ref 8.9–12.7)
POTASSIUM SERPL-SCNC: 4.2 MMOL/L (ref 3.5–5.3)
PROT SERPL-MCNC: 7.3 G/DL (ref 6.4–8.4)
RBC # BLD AUTO: 5.02 MILLION/UL (ref 3.88–5.62)
SODIUM SERPL-SCNC: 142 MMOL/L (ref 135–147)
TSH SERPL DL<=0.05 MIU/L-ACNC: 2.63 UIU/ML (ref 0.45–4.5)
WBC # BLD AUTO: 5.14 THOUSAND/UL (ref 4.31–10.16)

## 2022-10-02 PROCEDURE — 80053 COMPREHEN METABOLIC PANEL: CPT

## 2022-10-02 PROCEDURE — 85027 COMPLETE CBC AUTOMATED: CPT

## 2022-10-02 PROCEDURE — 84443 ASSAY THYROID STIM HORMONE: CPT

## 2022-10-02 PROCEDURE — 36415 COLL VENOUS BLD VENIPUNCTURE: CPT

## 2023-09-26 ENCOUNTER — HOSPITAL ENCOUNTER (EMERGENCY)
Facility: HOSPITAL | Age: 33
Discharge: HOME | End: 2023-09-26
Attending: EMERGENCY MEDICINE | Admitting: EMERGENCY MEDICINE
Payer: COMMERCIAL

## 2023-09-26 VITALS
SYSTOLIC BLOOD PRESSURE: 176 MMHG | DIASTOLIC BLOOD PRESSURE: 96 MMHG | TEMPERATURE: 97.2 F | OXYGEN SATURATION: 97 % | HEART RATE: 72 BPM | RESPIRATION RATE: 16 BRPM

## 2023-09-26 DIAGNOSIS — S61.412A LACERATION OF LEFT HAND WITHOUT FOREIGN BODY, INITIAL ENCOUNTER: Primary | ICD-10-CM

## 2023-09-26 PROCEDURE — 99284 EMERGENCY DEPT VISIT MOD MDM: CPT | Performed by: EMERGENCY MEDICINE

## 2023-09-26 PROCEDURE — 90471 IMMUNIZATION ADMIN: CPT

## 2023-09-26 PROCEDURE — 90715 TDAP VACCINE 7 YRS/> IM: CPT | Performed by: EMERGENCY MEDICINE

## 2023-09-26 PROCEDURE — 12001 RPR S/N/AX/GEN/TRNK 2.5CM/<: CPT | Performed by: EMERGENCY MEDICINE

## 2023-09-26 PROCEDURE — 99282 EMERGENCY DEPT VISIT SF MDM: CPT

## 2023-09-26 RX ORDER — LIDOCAINE HYDROCHLORIDE 10 MG/ML
5 INJECTION, SOLUTION EPIDURAL; INFILTRATION; INTRACAUDAL; PERINEURAL ONCE
Status: COMPLETED | OUTPATIENT
Start: 2023-09-26 | End: 2023-09-26

## 2023-09-26 RX ADMIN — LIDOCAINE HYDROCHLORIDE 5 ML: 10 INJECTION, SOLUTION EPIDURAL; INFILTRATION; INTRACAUDAL; PERINEURAL at 23:19

## 2023-09-26 RX ADMIN — TETANUS TOXOID, REDUCED DIPHTHERIA TOXOID AND ACELLULAR PERTUSSIS VACCINE, ADSORBED 0.5 ML: 5; 2.5; 8; 8; 2.5 SUSPENSION INTRAMUSCULAR at 22:42

## 2023-09-27 NOTE — ED PROVIDER NOTES
History  Chief Complaint   Patient presents with   • Hand Laceration     Patient cut top of hand on razor knife at 1530. Fe Chamorro is a 33y.o. year old right hand dominant male presenting to the [facility] ED for a left hand laceration. Patient states he cut left hand with an exacto knife at 1630 today while doing work in his house. He applied local wound care to the area. Patient had no pain in the area though could not stop bleeding so presents to ED. Patient denying pain in the area at this time. No weakness/numbness in the LUE. Patient has not taken/received any medications at home for relief of symptoms. Patient unsure if tetanus immunization is UTD. History provided by:  Patient and medical records   used: No        Prior to Admission Medications   Prescriptions Last Dose Informant Patient Reported? Taking?    CREATINE PO  Self Yes No   Sig: Take by mouth   Glucosamine-Chondroitin (GLUCOSAMINE CHONDR COMPLEX PO)  Self Yes No   Sig: Take by mouth   Green Tea, Kayla sinensis, (GREEN TEA EXTRACT PO)  Self Yes No   Sig: Take by mouth   Multiple Vitamin (multivitamin) tablet  Self Yes No   Sig: Take 1 tablet by mouth daily   Omega-3 Fatty Acids (OMEGA 3 PO)  Self Yes No   Sig: Take by mouth   Probiotic Product (PROBIOTIC DAILY PO)  Self Yes No   Sig: Take by mouth   acetaminophen (TYLENOL) 500 mg tablet  Self Yes No   Sig: Take 500 mg by mouth every 6 (six) hours as needed for mild pain   diphenhydrAMINE (BENADRYL) 25 mg capsule  Self No No   Sig: Take 1 capsule (25 mg total) by mouth every 6 (six) hours as needed for itching   Patient taking differently: Take 25 mg by mouth every 6 (six) hours as needed for allergies Usually in spring/summer       Facility-Administered Medications: None       Past Medical History:   Diagnosis Date   • Asthma     per pt age 11 or 8, no issues since   • Exercises 5 to 6 times per week     3 x/week heavy body work out and other days cardio   • Hearing deficit     left ear "feels muffled"   • Left inguinal hernia    • Personal history of COVID-19 01/10/2022    recovered at home/chief symptom per pt fatigue/ Not vaccinated   • Pollen allergy    • Red-green color blindness    • Wears glasses        Past Surgical History:   Procedure Laterality Date   • DENTAL SURGERY  2 years ago   • SC REPAIR ING HERNIA,5+Y/O,REDUCIBL Left 3/8/2022    Procedure: [address] Mesh;  Surgeon: Nga Ng MD;  Location: [facility];  Service: General       Family History   Problem Relation Age of Onset   • Diabetes Mother    • Diverticulitis Mother    • Neuropathy Mother    • Aortic stenosis Mother    • Hyperlipidemia Mother    • COPD Father    • Hypertension Father      I have reviewed and agree with the history as documented. E-Cigarette/Vaping   • E-Cigarette Use Never User      E-Cigarette/Vaping Substances   • Nicotine No    • THC No    • CBD No    • Flavoring No    • Other No    • Unknown No      Social History     Tobacco Use   • Smoking status: Never   • Smokeless tobacco: Never   Vaping Use   • Vaping Use: Never used   Substance Use Topics   • Alcohol use: No   • Drug use: No       Review of Systems   Constitutional: Negative for fever. Respiratory: Negative for shortness of breath. Cardiovascular: Negative for chest pain. Musculoskeletal: Negative for arthralgias and myalgias. Skin: Positive for wound. Neurological: Negative for weakness and numbness. All other systems reviewed and are negative. Physical Exam  Physical Exam  Vitals and nursing note reviewed. Constitutional:       General: He is not in acute distress. Appearance: Normal appearance. He is well-developed. He is not ill-appearing, toxic-appearing or diaphoretic. HENT:      Head: Normocephalic and atraumatic. Nose: No congestion or rhinorrhea. Eyes:      General:         Right eye: No discharge. Left eye: No discharge.    Cardiovascular: Rate and Rhythm: Normal rate and regular rhythm. Pulmonary:      Effort: Pulmonary effort is normal. No respiratory distress. Breath sounds: Normal breath sounds. No wheezing or rales. Musculoskeletal:      Right wrist: No swelling, effusion, tenderness, bony tenderness, snuff box tenderness or crepitus. Normal range of motion. Normal pulse. Right hand: Laceration (0.8cm ventral surface between 1-2 metacarpal. oozing bleeding. no visible muscle or nerve injury) present. No swelling, tenderness or bony tenderness. Normal range of motion. Normal strength. Normal sensation. There is no disruption of two-point discrimination. Normal capillary refill. Normal pulse. Cervical back: Normal range of motion. No rigidity. Comments: Left  Upper Extremity Exam  M/U/R/A nerve sensation intact.  strength 5/5. Finger abduction/adduction/opposition intact. Able to 1+2 and 1+5 finger appose. Able to 2+3 finger cross. No scaphoid tenderness  No snuff box tenderness. Good capillary refill. 2+ radial pulses  WE/WF/WRD/WUD 5/5, intact, without pain. Skin:     General: Skin is warm. Capillary Refill: Capillary refill takes less than 2 seconds. Findings: Laceration (left hand) present. Neurological:      Mental Status: He is alert and oriented to person, place, and time.    Psychiatric:         Mood and Affect: Mood normal.         Behavior: Behavior normal.         Vital Signs  ED Triage Vitals [09/26/23 2228]   Temperature Pulse Respirations Blood Pressure SpO2   (!) 97.2 °F (36.2 °C) 72 16 (!) 176/96 97 %      Temp Source Heart Rate Source Patient Position - Orthostatic VS BP Location FiO2 (%)   Tympanic Monitor Sitting -- --      Pain Score       --           Vitals:    09/26/23 2228   BP: (!) 176/96   Pulse: 72   Patient Position - Orthostatic VS: Sitting         Visual Acuity      ED Medications  Medications   tetanus-diphtheria-acellular pertussis (BOOSTRIX) IM injection 0.5 mL (0.5 mL Intramuscular Given 9/26/23 5431)   lidocaine (PF) (XYLOCAINE-MPF) 1 % injection 5 mL (5 mL Infiltration Given 9/26/23 3995)       Diagnostic Studies  Results Reviewed     None                 No orders to display              Procedures  Universal Protocol:  Procedure performed by:  Consent: Verbal consent obtained. Risks and benefits: risks, benefits and alternatives were discussed  Consent given by: patient  Patient understanding: patient states understanding of the procedure being performed    Laceration repair    Date/Time: 9/26/2023 11:05 PM    Performed by: Wilnette Bamberger, DO  Authorized by: Wilnette Bamberger, DO  Body area: upper extremity  Location details: left hand  Laceration length: 0.8 cm  Foreign bodies: no foreign bodies  Tendon involvement: none  Nerve involvement: none  Anesthesia: local infiltration    Anesthesia:  Local Anesthetic: lidocaine 1% without epinephrine  Anesthetic total: 3 mL      Procedure Details:  Preparation: Patient was prepped and draped in the usual sterile fashion. Irrigation solution: saline  Irrigation method: tap  Amount of cleaning: extensive  Skin closure: 4-0 nylon  Number of sutures: 3  Technique: simple  Approximation: close  Approximation difficulty: simple  Patient tolerance: patient tolerated the procedure well with no immediate complications               ED Course                                             Medical Decision Making    33 y.o. male presenting for left hand laceration. Left upper extremity neuro intact. No bony tenderness I do not suspect underlying fracture. No visible nerve or vascular injury. Wound copiously irrigated and laceration repaired as documented above. Tetanus immunization updated in the emergency department. I have discussed with the patient our plan to discharge them from the ED and the patient is in agreement with this plan. The patient was provided a written after visit summary with strict RTED precautions. Discharge Plan: local wound care, suture removal 7-10 days. Followup: I have discussed with the patient plan to follow up with their PCP. Contact information provided in AVS.    Risk  Prescription drug management. Disposition  Final diagnoses:   Laceration of left hand without foreign body, initial encounter     Time reflects when diagnosis was documented in both MDM as applicable and the Disposition within this note     Time User Action Codes Description Comment    9/26/2023 10:35 PM Manuela Flavors Add [E85.521Q] Laceration of left hand without foreign body, initial encounter       ED Disposition     ED Disposition   Discharge    Condition   Stable    Date/Time   Tue Sep 26, 2023 11:15 PM    Comment   Aaliyah Sheikhs discharge to home/self care. Follow-up Information     Follow up With Specialties Details Why Contact Marivel Coffey,  Family Medicine Schedule an appointment as soon as possible for a visit  To make appointment for reevaluation in 3-5 days.  [facility] [address]  [phone]            Discharge Medication List as of 9/26/2023 11:15 PM      CONTINUE these medications which have NOT CHANGED    Details   acetaminophen (TYLENOL) 500 mg tablet Take 500 mg by mouth every 6 (six) hours as needed for mild pain, Historical Med      CREATINE PO Take by mouth, Historical Med      diphenhydrAMINE (BENADRYL) 25 mg capsule Take 1 capsule (25 mg total) by mouth every 6 (six) hours as needed for itching, Starting Mon 9/10/2018, Print      Glucosamine-Chondroitin (GLUCOSAMINE CHONDR COMPLEX PO) Take by mouth, Historical Med      Green Tea, Kayla sinensis, (GREEN TEA EXTRACT PO) Take by mouth, Historical Med      Multiple Vitamin (multivitamin) tablet Take 1 tablet by mouth daily, Historical Med      Omega-3 Fatty Acids (OMEGA 3 PO) Take by mouth, Historical Med      Probiotic Product (PROBIOTIC DAILY PO) Take by mouth, Historical Med No discharge procedures on file.     PDMP Review       Value Time User    PDMP Reviewed  Yes 3/8/2022 12:06 PM Hernandez Conner PA-C          ED Provider  Electronically Signed by           Jewel Brambila DO  09/27/23 8962

## 2025-06-28 ENCOUNTER — OFFICE VISIT (OUTPATIENT)
Dept: URGENT CARE | Facility: MEDICAL CENTER | Age: 35
End: 2025-06-28
Payer: COMMERCIAL

## 2025-06-28 VITALS
DIASTOLIC BLOOD PRESSURE: 78 MMHG | SYSTOLIC BLOOD PRESSURE: 142 MMHG | HEIGHT: 68 IN | OXYGEN SATURATION: 98 % | HEART RATE: 87 BPM | RESPIRATION RATE: 20 BRPM | WEIGHT: 192 LBS | BODY MASS INDEX: 29.1 KG/M2 | TEMPERATURE: 98.2 F

## 2025-06-28 DIAGNOSIS — J02.9 ACUTE PHARYNGITIS, UNSPECIFIED ETIOLOGY: Primary | ICD-10-CM

## 2025-06-28 LAB — S PYO AG THROAT QL: NEGATIVE

## 2025-06-28 PROCEDURE — 87880 STREP A ASSAY W/OPTIC: CPT | Performed by: PHYSICIAN ASSISTANT

## 2025-06-28 PROCEDURE — 87070 CULTURE OTHR SPECIMN AEROBIC: CPT | Performed by: PHYSICIAN ASSISTANT

## 2025-06-28 PROCEDURE — 99202 OFFICE O/P NEW SF 15 MIN: CPT | Performed by: PHYSICIAN ASSISTANT

## 2025-06-28 NOTE — PROGRESS NOTES
[facility]        NAME: Manpreet Grey is a 34 y.o. male  : 1990    MRN: [id number]  DATE: 2025  TIME: 7:34 PM    Assessment and Plan   Acute pharyngitis, unspecified etiology [J02.9]  1. Acute pharyngitis, unspecified etiology  POCT rapid ANTIGEN strepA    Throat culture        Acute pharyngitis: Rapid strep negative, will send out for culture and treat if positive.   Stay well hydrated.   Washing hands frequently with warm water and soap may help stop spread of infection.  May alternate tylenol with ibuprofen every 3 hours to help manage fever and/or discomfort PRN.       Patient Instructions       Follow up with PCP in 3-5 days.  Proceed to  ER if symptoms worsen.    If tests have been performed at [facility], our office will contact you with results if changes need to be made to the care plan discussed with you at the visit.  You can review your full results on [facility] MyChart.    Chief Complaint     Chief Complaint   Patient presents with    Fatigue     Fatigue Mild headache and swelling of neck today. Emy sore throat, but styates that neck is tender         History of Present Illness       Manpreet presents for evaluation of fatigue, neck swelling and discomfort that started this morning. Also with some stomach upset, unsure if food related or not. Has had a mild headache.   Normal appetite, drinking. Normal urine output and bowel movements.   Denies fever, cough, congestion, sore throat.         Review of Systems   Review of Systems   Constitutional:  Negative for activity change, appetite change, fatigue and fever.   HENT:  Positive for sore throat. Negative for congestion, ear pain, rhinorrhea, sinus pressure, sinus pain, sneezing and trouble swallowing.    Eyes:  Negative for discharge and redness.   Respiratory:  Negative for cough, shortness of breath and wheezing.    Gastrointestinal:  Positive for abdominal pain. Negative for constipation, diarrhea, nausea and vomiting.  "  Skin:  Negative for rash.   Neurological:  Positive for headaches.         Current Medications     Current Medications[1]    Current Allergies     Allergies as of 06/28/2025    (No Known Allergies)            The following portions of the patient's history were reviewed and updated as appropriate: allergies, current medications, past family history, past medical history, past social history, past surgical history and problem list.     Past Medical History[2]    Past Surgical History[3]    Family History[4]      Medications have been verified.        Objective   /78   Pulse 87   Temp 98.2 °F (36.8 °C)   Resp 20   Ht 5' 8\" (1.727 m)   Wt 87.1 kg (192 lb)   SpO2 98%   BMI 29.19 kg/m²   No LMP for male patient.       Physical Exam     Physical Exam  Vitals and nursing note reviewed.   Constitutional:       General: He is awake.      Appearance: Normal appearance. He is well-developed, well-groomed and normal weight. He is not ill-appearing.   HENT:      Head: Normocephalic.      Right Ear: Tympanic membrane, ear canal and external ear normal.      Left Ear: Tympanic membrane, ear canal and external ear normal.      Nose: Nose normal. No nasal deformity.      Mouth/Throat:      Lips: Pink. No lesions.      Mouth: Mucous membranes are moist.      Pharynx: Uvula midline. Posterior oropharyngeal erythema present. No oropharyngeal exudate.      Tonsils: No tonsillar exudate.     Eyes:      General: Lids are normal.      Conjunctiva/sclera: Conjunctivae normal.      Pupils: Pupils are equal, round, and reactive to light.     Neck:      Thyroid: No thyromegaly.     Cardiovascular:      Rate and Rhythm: Normal rate and regular rhythm.      Heart sounds: Normal heart sounds. No murmur heard.  Pulmonary:      Effort: Pulmonary effort is normal.      Breath sounds: Normal breath sounds. No decreased breath sounds, wheezing, rhonchi or rales.   Abdominal:      General: Bowel sounds are normal.      Palpations: " Abdomen is soft.      Tenderness: There is no abdominal tenderness.      Hernia: No hernia is present.     Musculoskeletal:      Cervical back: Normal range of motion and neck supple.   Lymphadenopathy:      Head:      Right side of head: Tonsillar adenopathy present. No submandibular, preauricular or posterior auricular adenopathy.      Left side of head: Tonsillar adenopathy present. No submandibular, preauricular or posterior auricular adenopathy.      Cervical: No cervical adenopathy.     Skin:     General: Skin is warm and dry.      Capillary Refill: Capillary refill takes less than 2 seconds.      Coloration: Skin is not pale.      Findings: No rash.     Neurological:      Mental Status: He is alert and oriented to person, place, and time.      Comments: CN II-X grossly intact.   Psychiatric:         Speech: Speech normal.         Behavior: Behavior normal. Behavior is cooperative.                        [1]   Current Outpatient Medications:     acetaminophen (TYLENOL) 500 mg tablet, Take 500 mg by mouth every 6 (six) hours as needed for mild pain, Disp: , Rfl:     CREATINE PO, Take by mouth, Disp: , Rfl:     Glucosamine-Chondroitin (GLUCOSAMINE CHONDR COMPLEX PO), Take by mouth, Disp: , Rfl:     Green Tea, Kayla sinensis, (GREEN TEA EXTRACT PO), Take by mouth, Disp: , Rfl:     Multiple Vitamin (multivitamin) tablet, Take 1 tablet by mouth in the morning., Disp: , Rfl:     Omega-3 Fatty Acids (OMEGA 3 PO), Take by mouth, Disp: , Rfl:     diphenhydrAMINE (BENADRYL) 25 mg capsule, Take 1 capsule (25 mg total) by mouth every 6 (six) hours as needed for itching (Patient taking differently: Take 25 mg by mouth every 6 (six) hours as needed for allergies Usually in spring/summer ), Disp: 20 capsule, Rfl: 0    Probiotic Product (PROBIOTIC DAILY PO), Take by mouth (Patient not taking: Reported on 6/28/2025), Disp: , Rfl:   [2]   Past Medical History:  Diagnosis Date    Asthma     per pt age 7 or 8, no issues since  "   Exercises 5 to 6 times per week     3 x/week heavy body work out and other days cardio    Hearing deficit     left ear \"feels muffled\"    Left inguinal hernia     Personal history of COVID-19 01/10/2022    recovered at home/chief symptom per pt fatigue/ Not vaccinated    Pollen allergy     Red-green color blindness     Wears glasses    [3]   Past Surgical History:  Procedure Laterality Date    DENTAL SURGERY  2 years ago    SD RPR 1ST INGUN HRNA AGE 6 YRS/> REDUCIBLE Left 3/8/2022    Procedure: REPAIR HERNIA INGUINALwith Mesh;  Surgeon: Greg Tilley MD;  Location: [facility];  Service: General   [4]   Family History  Problem Relation Name Age of Onset    Diabetes Mother      Diverticulitis Mother      Neuropathy Mother      Aortic stenosis Mother      Hyperlipidemia Mother      COPD Father      Hypertension Father       "

## 2025-06-28 NOTE — PATIENT INSTRUCTIONS
Stay well hydrated.  Tylenol or motrin as needed for discomfort.  If throat culture comes back positive, we will call in an antibiotic for you.

## 2025-06-29 LAB — BACTERIA THROAT CULT: NORMAL

## 2025-06-30 LAB — BACTERIA THROAT CULT: NORMAL

## 2025-07-17 ENCOUNTER — HOSPITAL ENCOUNTER (OUTPATIENT)
Dept: ULTRASOUND IMAGING | Facility: HOSPITAL | Age: 35
Discharge: HOME | End: 2025-07-17
Attending: FAMILY MEDICINE
Payer: COMMERCIAL

## 2025-07-17 DIAGNOSIS — R22.1 LOCALIZED SWELLING, MASS AND LUMP, NECK: ICD-10-CM

## 2025-07-17 PROCEDURE — 76536 US EXAM OF HEAD AND NECK: CPT

## (undated) DEVICE — NEEDLE 25G X 1 1/2

## (undated) DEVICE — SUT VICRYL 3-0 REEL 54 IN J285G

## (undated) DEVICE — CHLORAPREP HI-LITE 26ML ORANGE

## (undated) DEVICE — GLOVE INDICATOR UNDERGLOVE SZ 7.5 GREEN

## (undated) DEVICE — ADHESIVE SKIN HIGH VISCOSITY EXOFIN 1ML

## (undated) DEVICE — INTENDED FOR TISSUE SEPARATION, AND OTHER PROCEDURES THAT REQUIRE A SHARP SURGICAL BLADE TO PUNCTURE OR CUT.: Brand: BARD-PARKER ® CARBON RIB-BACK BLADES

## (undated) DEVICE — SUT VICRYL 2-0 CP-1 27 IN J266H

## (undated) DEVICE — PLUMEPEN PRO 10FT

## (undated) DEVICE — POOLE SUCTION HANDLE W/TUBING: Brand: CARDINAL HEALTH

## (undated) DEVICE — ANTIBACTERIAL VIOLET BRAIDED (POLYGLACTIN 910), SYNTHETIC ABSORBABLE SUTURE: Brand: COATED VICRYL

## (undated) DEVICE — SYRINGE 10ML LL

## (undated) DEVICE — SUT VICRYL 3-0 SH 27 IN J416H

## (undated) DEVICE — GLOVE SRG BIOGEL 7.5

## (undated) DEVICE — GLOVE SRG BIOGEL 7

## (undated) DEVICE — BETHLEHEM UNIVERSAL MINOR GEN: Brand: CARDINAL HEALTH

## (undated) DEVICE — SPECIMEN CONTAINER STERILE PEEL PACK

## (undated) DEVICE — STRL PENROSE DRAIN 18" X 1/4": Brand: CARDINAL HEALTH

## (undated) DEVICE — SUT MONOCRYL 4-0 PS-2 27 IN Y426H